# Patient Record
Sex: FEMALE | Race: WHITE | NOT HISPANIC OR LATINO | Employment: UNEMPLOYED | URBAN - NONMETROPOLITAN AREA
[De-identification: names, ages, dates, MRNs, and addresses within clinical notes are randomized per-mention and may not be internally consistent; named-entity substitution may affect disease eponyms.]

---
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
## 2024-01-22 ENCOUNTER — OFFICE VISIT (OUTPATIENT)
Dept: PSYCHIATRY | Facility: CLINIC | Age: 19
End: 2024-01-22
Payer: COMMERCIAL

## 2024-01-22 DIAGNOSIS — F90.9 ATTENTION DEFICIT HYPERACTIVITY DISORDER (ADHD), UNSPECIFIED ADHD TYPE: ICD-10-CM

## 2024-01-22 DIAGNOSIS — Z91.52 PERSONAL HISTORY OF NONSUICIDAL SELF-HARM: ICD-10-CM

## 2024-01-22 DIAGNOSIS — R45.81 LOW SELF ESTEEM: ICD-10-CM

## 2024-01-22 DIAGNOSIS — F43.10 POSTTRAUMATIC STRESS DISORDER: Primary | ICD-10-CM

## 2024-01-22 DIAGNOSIS — F39 UNSPECIFIED MOOD (AFFECTIVE) DISORDER: ICD-10-CM

## 2024-01-22 DIAGNOSIS — Z60.4 SOCIAL ISOLATION: ICD-10-CM

## 2024-01-22 DIAGNOSIS — F50.9 ATYPICAL ANOREXIA NERVOSA: ICD-10-CM

## 2024-01-22 SDOH — SOCIAL STABILITY - SOCIAL INSECURITY: SOCIAL EXCLUSION AND REJECTION: Z60.4

## 2024-01-24 ENCOUNTER — OFFICE VISIT (OUTPATIENT)
Dept: FAMILY MEDICINE CLINIC | Facility: CLINIC | Age: 19
End: 2024-01-24
Payer: COMMERCIAL

## 2024-01-24 VITALS
BODY MASS INDEX: 27.6 KG/M2 | WEIGHT: 150 LBS | DIASTOLIC BLOOD PRESSURE: 85 MMHG | SYSTOLIC BLOOD PRESSURE: 120 MMHG | HEIGHT: 62 IN | OXYGEN SATURATION: 99 % | TEMPERATURE: 97 F

## 2024-01-24 DIAGNOSIS — F33.3 SEVERE EPISODE OF RECURRENT MAJOR DEPRESSIVE DISORDER, WITH PSYCHOTIC FEATURES: Primary | ICD-10-CM

## 2024-01-24 DIAGNOSIS — R45.88 NONSUICIDAL SELF-INJURY: ICD-10-CM

## 2024-01-24 DIAGNOSIS — E55.9 VITAMIN D DEFICIENCY: ICD-10-CM

## 2024-01-24 DIAGNOSIS — R79.89 ELEVATED LFTS: ICD-10-CM

## 2024-01-24 DIAGNOSIS — Q24.9 HEART DEFECT, CONGENITAL: ICD-10-CM

## 2024-01-24 DIAGNOSIS — T07.XXXA MULTIPLE ABRASIONS: ICD-10-CM

## 2024-01-24 DIAGNOSIS — F39 UNSPECIFIED MOOD (AFFECTIVE) DISORDER: ICD-10-CM

## 2024-01-24 DIAGNOSIS — F41.9 ANXIETY DISORDER, UNSPECIFIED TYPE: ICD-10-CM

## 2024-01-24 PROCEDURE — 86803 HEPATITIS C AB TEST: CPT | Performed by: PHYSICIAN ASSISTANT

## 2024-01-24 PROCEDURE — 82977 ASSAY OF GGT: CPT | Performed by: PHYSICIAN ASSISTANT

## 2024-01-24 PROCEDURE — 80050 GENERAL HEALTH PANEL: CPT | Performed by: PHYSICIAN ASSISTANT

## 2024-01-24 PROCEDURE — 84466 ASSAY OF TRANSFERRIN: CPT | Performed by: PHYSICIAN ASSISTANT

## 2024-01-24 PROCEDURE — 82306 VITAMIN D 25 HYDROXY: CPT | Performed by: PHYSICIAN ASSISTANT

## 2024-01-24 PROCEDURE — 86704 HEP B CORE ANTIBODY TOTAL: CPT | Performed by: PHYSICIAN ASSISTANT

## 2024-01-24 PROCEDURE — 86706 HEP B SURFACE ANTIBODY: CPT | Performed by: PHYSICIAN ASSISTANT

## 2024-01-24 PROCEDURE — 80061 LIPID PANEL: CPT | Performed by: PHYSICIAN ASSISTANT

## 2024-01-24 PROCEDURE — 83540 ASSAY OF IRON: CPT | Performed by: PHYSICIAN ASSISTANT

## 2024-01-24 PROCEDURE — 82728 ASSAY OF FERRITIN: CPT | Performed by: PHYSICIAN ASSISTANT

## 2024-01-24 PROCEDURE — 83735 ASSAY OF MAGNESIUM: CPT | Performed by: PHYSICIAN ASSISTANT

## 2024-01-24 RX ORDER — HYDROXYZINE 50 MG/1
50 TABLET, FILM COATED ORAL 2 TIMES DAILY
COMMUNITY

## 2024-01-24 RX ORDER — PRAZOSIN HYDROCHLORIDE 1 MG/1
1 CAPSULE ORAL NIGHTLY
COMMUNITY
End: 2024-01-24 | Stop reason: SDUPTHER

## 2024-01-24 RX ORDER — BUSPIRONE HYDROCHLORIDE 7.5 MG/1
7.5 TABLET ORAL 2 TIMES DAILY
Qty: 60 TABLET | Refills: 0 | Status: SHIPPED | OUTPATIENT
Start: 2024-01-24

## 2024-01-24 RX ORDER — PRAZOSIN HYDROCHLORIDE 2 MG/1
2 CAPSULE ORAL NIGHTLY
Qty: 30 CAPSULE | Refills: 0 | Status: SHIPPED | OUTPATIENT
Start: 2024-01-24

## 2024-01-24 RX ORDER — BUSPIRONE HYDROCHLORIDE 5 MG/1
5 TABLET ORAL 2 TIMES DAILY
COMMUNITY
End: 2024-01-24 | Stop reason: SDUPTHER

## 2024-01-24 RX ORDER — SERTRALINE HYDROCHLORIDE 100 MG/1
200 TABLET, FILM COATED ORAL DAILY
Start: 2024-01-24

## 2024-01-24 NOTE — PROGRESS NOTES
"    Subjective        Chief Complaint  Establish Care    Subjective      Alyse Campbell is a 18 y.o. female who presents today to [facility] for Establish Care. She has a past medical history of ADHD, Anxiety, Asthma, Depression, PTSD, Self-injurious behavior, and Suicide attempt. She also has a history of a \"hole in her heart.\"    Depression:   Anxiety:   Self harm:   Hx of self-injurious behavior:   Eating disorder:   Reports a long history of anxiety, depression, and self injurious behavior.  She had previously been following with  psychiatry, however, last appointment with the psychiatric NP was in April 2023 and she did not keep her follow up in May 2023.  She has recently reestablished with the counselor there and was recommended to come here to establish primary care and obtain labs.  She has an upcoming appointment with the psychiatric NP, however, this is not until early March 2024.    She does report that she intermittently lives in [place] which is where her family lives. She prefers not to go home. She is here to go to [employer] and she wants to go into neuroscience. She has a lot of anxiety about school. She reports she is young for her classes and feels awkward at times. She has a roommate whom she gets along with. She reports a history of trauma growing up and this has affected her for many years. She does not go into detail. She has trouble sleeping at night. She is currently managed on prazosin 1mg nightly, sertraline 200mg daily, buspar 5mg BID, and hydroxyzine twice daily, she is unsure of that dose. She also takes methylphenidate for ADHD. She had seen her PCP in [place] about 1 week ago and reportedly was recommended to discontinue all of her medications with plans to start Effexor, however, she had not completed this transition yet.  She has not taken her medications regularly in the past, however, does report she has recently began taking her " "medications regularly again.     She has a history of nonsuicidal self injurious behavior and previous suicide attempt.  She reports cutting herself for several years and she has recently started this again.  She reports the cuts are mostly on her arms and superficial. She feels a need to punish herself and cause pain. She also restricts her eating at times. She has had some recent nonspecific suicidal thoughts, but denies any specific plan or intent. Reports having people to go to if she has these thoughts, but does not utilize them all of time. Her last attempt at suicide was reportedly in the middle of summer 2023 when she \"took some pills and went over to the [facility] tracks.\"  She reports being hospitalized at an inpatient psychiatric facility at that time and reports it did not help her symptoms. She reports her symptoms are not as bad as they were last summer. Adamantly denies current thoughts, intent, or plan for suicide and states she would never do so.  Has goals of completing school and becoming a neuroscientist.     D[place]den Suicide Severity Rating (C-SSRS): 3= Moderate risk.     Heart Defect:  She also reports being told she had a hole in her heart when she was a child.  She was evaluated for surgical correction, however, reported it is a high risk surgery.  She was last seen by cardiology as a child in [place].  No recent evaluation or echocardiogram.  She has chronic shortness of breath and chest discomfort with exertion, no recent change.  She also has chronic palpitations and wore a Holter monitor in [place] over the past summer without reported concerns.  She has a history of a syncopal episode over the summer, however, this was with significant heat exposure.  No recurrent syncope since then.    Hx of elevated LFTs:   LFTs previously elevated in the ER in 04/2023. Has not had them rechecked. Previously felt to be 2/2 her medications. No reported alcohol use or regular tylenol. No known " "liver conditions.       Current Outpatient Medications:     busPIRone (BUSPAR) 7.5 MG tablet, Take 1 tablet by mouth 2 (Two) Times a Day., Disp: 60 tablet, Rfl: 0    methylphenidate 27 MG CR tablet, TAKE ONE TABLET BY MOUTH EVERY MORNING. MAX DAILY AMOUNT 27MG, Disp: , Rfl:     prazosin (MINIPRESS) 2 MG capsule, Take 1 capsule by mouth Every Night., Disp: 30 capsule, Rfl: 0    sertraline (ZOLOFT) 100 MG tablet, Take 2 tablets by mouth Daily. Indications: Major Depressive Disorder, Disp: , Rfl:     hydrOXYzine (ATARAX) 50 MG tablet, Take 1 tablet by mouth 2 (Two) Times a Day., Disp: , Rfl:     mupirocin (BACTROBAN) 2 % ointment, Apply 1 application  topically to the appropriate area as directed 3 (Three) Times a Day for 10 days., Disp: 30 g, Rfl: 0      No Known Allergies    Objective     Objective   Vital Signs:  /85   Temp 97 °F (36.1 °C) (Temporal)   Ht 157.5 cm (62.01\")   Wt 68 kg (150 lb)   SpO2 99%   BMI 27.43 kg/m²   Estimated body mass index is 27.43 kg/m² as calculated from the following:    Height as of this encounter: 157.5 cm (62.01\").    Weight as of this encounter: 68 kg (150 lb).    Pediatric BMI = 88 %ile (Z= 1.20) based on CDC (Girls, 2-20 Years) BMI-for-age based on BMI available as of 1/24/2024.. BMI is >= 25 and <30. (Overweight) The following options were offered after discussion;: weight loss educational material (shared in after visit summary)    Past Medical History:   Diagnosis Date    ADHD (attention deficit hyperactivity disorder)     Anxiety     Asthma     Depression     Self-injurious behavior     Started cutting at age 10.    Suicide attempt     10/2022-\"I swallowed pills.\"     Past Surgical History:   Procedure Laterality Date    NO PAST SURGERIES       Social History     Socioeconomic History    Marital status: Single    Number of children: 0    Years of education: 12 th    Highest education level: High school graduate   Tobacco Use    Smoking status: Never     Passive " exposure: Never    Smokeless tobacco: Never   Vaping Use    Vaping Use: Never used   Substance and Sexual Activity    Alcohol use: Never    Drug use: Never    Sexual activity: Not Currently     Partners: Male      Physical Exam  Vitals and nursing note reviewed.   Constitutional:       General: She is not in acute distress.     Appearance: She is well-developed. She is not diaphoretic.      Comments: She is quiet. Responds to questions appropriately, but generally pauses for a bit prior to responding.    HENT:      Head: Normocephalic and atraumatic.   Eyes:      General: No scleral icterus.        Right eye: No discharge.         Left eye: No discharge.      Conjunctiva/sclera: Conjunctivae normal.   Cardiovascular:      Rate and Rhythm: Normal rate and regular rhythm.      Heart sounds: Normal heart sounds. No murmur heard.     No friction rub. No gallop.   Pulmonary:      Effort: Pulmonary effort is normal. No respiratory distress.      Breath sounds: Normal breath sounds. No wheezing or rales.   Chest:      Chest wall: No tenderness.   Musculoskeletal:         General: Normal range of motion.      Cervical back: Normal range of motion and neck supple.      Right lower leg: No edema.      Left lower leg: No edema.   Skin:     General: Skin is warm and dry.      Capillary Refill: Capillary refill takes less than 2 seconds.      Coloration: Skin is not pale.      Findings: No erythema or rash.      Comments: Patient was agreeable to allowing me to evaluate her forearms where she has been cutting. Multiple superficial horizontal cuts on the proximal forearm bilaterally. Mild surrounding erythema on the left > right. Also mild warmth, left > right. Scabbing noted. Scarring from previous similar cuts also noted. No deep cuts appreciated.    Neurological:      Mental Status: She is alert and oriented to person, place, and time.   Psychiatric:      Comments: She appears anxious. Slow to respond to questioning at times,  "but does respond appropriately.         Result Review :  The following data was reviewed by: SYBIL Latham on 01/24/2024:  No results found for: \"CBCDIFFPANEL\", \"CMP\", \"HGBA1C\", \"TSH\", \"HDL\", \"LDL\", \"TRIG\", \"CHLPL\", \"XSOO376\"        Assessment / Plan         Assessment   Diagnoses and all orders for this visit:    1. Severe episode of recurrent major depressive disorder, with psychotic features (Primary)  2. Nonsuicidal self-injury  3. Unspecified mood (affective) disorder  4. Anxiety disorder, unspecified type  Currently adamantly denies SI/HI. Discussed that with any development of SI/HI, to call 911 or go directly to the ER. I have also provided with resources in the AVS and numbers for suicide hotlines.   Continue counseling.   Currently not scheduled to get back in with Sherrie NANCY Rojas until 03/2024, will see about getting her back in sooner if possible. Will also plan to discuss medications with Sherrie when she is back in the office.   Given increased anxiety and trouble sleeping seem to be worsening her overall mood and depression, discussed option of increasing buspar to 7.5mg BID and prazosin to 2mg nightly. Discussed risks, benefits, alternatives. She expressed understanding and agreed.   Continue sertraline, currently at max dose of 200mg daily.   Follow up in 2 weeks or sooner if needed.     5. Elevated LFTs  She is agreeable to updated labs, obtained today as well as additional testing including viral hepatitis, iron profile, and GGT.     6. Heart defect  Obtain updated transthoracic echocardiogram with bubble study.   Consider referral to cardiology pending results.   Reports having a holter over the summer in [place] without reported abnormality. Will consider repeating if symptoms are increasing or becoming more frequent.     7. Multiple abrasions bilateral upper extremities  Discussed risks of self harm. Discussed signs/symptoms of infection and things to monitor for. "   Antibacterial ointment was applied to both proximal forearms and lightly dressed with gauze and coban. Patient denied them being tight or uncomfortable. Bactroban sent to pharmacy given mild redness and warmth noted. Apply TID x 10 days. Then can put thin layer of Vaseline to keep the areas moist until healed.   Return to clinic if no improvement noted or if symptoms are worsening.  As noted above, adamantly denies SI/HI at this time. No suicidal thoughts, plan, or intent. Contact 911 or go to the ER should these develop.    Other orders  -     busPIRone (BUSPAR) 7.5 MG tablet; Take 1 tablet by mouth 2 (Two) Times a Day.  Dispense: 60 tablet; Refill: 0  -     mupirocin (BACTROBAN) 2 % ointment; Apply 1 application  topically to the appropriate area as directed 3 (Three) Times a Day for 10 days.  Dispense: 30 g; Refill: 0  -     prazosin (MINIPRESS) 2 MG capsule; Take 1 capsule by mouth Every Night.  Dispense: 30 capsule; Refill: 0     New Medications Ordered This Visit   Medications    sertraline (ZOLOFT) 100 MG tablet     Sig: Take 2 tablets by mouth Daily. Indications: Major Depressive Disorder    busPIRone (BUSPAR) 7.5 MG tablet     Sig: Take 1 tablet by mouth 2 (Two) Times a Day.     Dispense:  60 tablet     Refill:  0    mupirocin (BACTROBAN) 2 % ointment     Sig: Apply 1 application  topically to the appropriate area as directed 3 (Three) Times a Day for 10 days.     Dispense:  30 g     Refill:  0    prazosin (MINIPRESS) 2 MG capsule     Sig: Take 1 capsule by mouth Every Night.     Dispense:  30 capsule     Refill:  0     Follow Up   Return in about 2 weeks (around 2/7/2024), or if symptoms worsen or fail to improve, for Recheck.    I spent 100 minutes caring for Alyse Campbell on this date of service. This time includes time spent by me in the following activities: preparing for the visit, reviewing tests, obtaining and/or reviewing a separately obtained history, performing a medically appropriate  examination and/or evaluation , counseling and educating the patient/family/caregiver, ordering medications, tests, or procedures and documenting information in the medical record.       Patient was given instructions and counseling regarding her condition or for health maintenance advice. Please see specific information pulled into the AVS if appropriate.       This document has been electronically signed by SYBIL Latham   January 24, 2024 12:15 EST    Dictated Utilizing Dragon Dictation: Part of this note may be an electronic transcription/translation of spoken language to printed text using the Dragon Dictation System.

## 2024-01-25 LAB
25(OH)D3 SERPL-MCNC: 20.9 NG/ML (ref 30–100)
ALBUMIN SERPL-MCNC: 5 G/DL (ref 3.5–5.2)
ALBUMIN/GLOB SERPL: 2.4 G/DL
ALP SERPL-CCNC: 69 U/L (ref 39–117)
ALT SERPL W P-5'-P-CCNC: 10 U/L (ref 1–33)
ANION GAP SERPL CALCULATED.3IONS-SCNC: 12 MMOL/L (ref 5–15)
AST SERPL-CCNC: 15 U/L (ref 1–32)
BASOPHILS # BLD AUTO: 0.03 10*3/MM3 (ref 0–0.2)
BASOPHILS NFR BLD AUTO: 0.6 % (ref 0–1.5)
BILIRUB SERPL-MCNC: 0.2 MG/DL (ref 0–1.2)
BUN SERPL-MCNC: 10 MG/DL (ref 6–20)
BUN/CREAT SERPL: 13.3 (ref 7–25)
CALCIUM SPEC-SCNC: 9.6 MG/DL (ref 8.6–10.5)
CHLORIDE SERPL-SCNC: 106 MMOL/L (ref 98–107)
CHOLEST SERPL-MCNC: 154 MG/DL (ref 0–200)
CO2 SERPL-SCNC: 19 MMOL/L (ref 22–29)
CREAT SERPL-MCNC: 0.75 MG/DL (ref 0.57–1)
DEPRECATED RDW RBC AUTO: 44.7 FL (ref 37–54)
EGFRCR SERPLBLD CKD-EPI 2021: 117.8 ML/MIN/1.73
EOSINOPHIL # BLD AUTO: 0.04 10*3/MM3 (ref 0–0.4)
EOSINOPHIL NFR BLD AUTO: 0.8 % (ref 0.3–6.2)
ERYTHROCYTE [DISTWIDTH] IN BLOOD BY AUTOMATED COUNT: 15.2 % (ref 12.3–15.4)
FERRITIN SERPL-MCNC: 7.28 NG/ML (ref 13–150)
GGT SERPL-CCNC: 12 U/L (ref 5–36)
GLOBULIN UR ELPH-MCNC: 2.1 GM/DL
GLUCOSE SERPL-MCNC: 93 MG/DL (ref 65–99)
HBV SURFACE AB SER RIA-ACNC: NORMAL
HCT VFR BLD AUTO: 35.6 % (ref 34–46.6)
HCV AB SER DONR QL: NORMAL
HDLC SERPL-MCNC: 62 MG/DL (ref 40–60)
HGB BLD-MCNC: 11.6 G/DL (ref 12–15.9)
IMM GRANULOCYTES # BLD AUTO: 0.01 10*3/MM3 (ref 0–0.05)
IMM GRANULOCYTES NFR BLD AUTO: 0.2 % (ref 0–0.5)
IRON 24H UR-MRATE: 28 MCG/DL (ref 37–145)
IRON SATN MFR SERPL: 6 % (ref 20–50)
LDLC SERPL CALC-MCNC: 81 MG/DL (ref 0–100)
LDLC/HDLC SERPL: 1.32 {RATIO}
LYMPHOCYTES # BLD AUTO: 1.18 10*3/MM3 (ref 0.7–3.1)
LYMPHOCYTES NFR BLD AUTO: 23.7 % (ref 19.6–45.3)
MAGNESIUM SERPL-MCNC: 2.4 MG/DL (ref 1.7–2.2)
MCH RBC QN AUTO: 26.7 PG (ref 26.6–33)
MCHC RBC AUTO-ENTMCNC: 32.6 G/DL (ref 31.5–35.7)
MCV RBC AUTO: 82 FL (ref 79–97)
MONOCYTES # BLD AUTO: 0.24 10*3/MM3 (ref 0.1–0.9)
MONOCYTES NFR BLD AUTO: 4.8 % (ref 5–12)
NEUTROPHILS NFR BLD AUTO: 3.48 10*3/MM3 (ref 1.7–7)
NEUTROPHILS NFR BLD AUTO: 69.9 % (ref 42.7–76)
NRBC BLD AUTO-RTO: 0 /100 WBC (ref 0–0.2)
PLATELET # BLD AUTO: 346 10*3/MM3 (ref 140–450)
PMV BLD AUTO: 11.1 FL (ref 6–12)
POTASSIUM SERPL-SCNC: 3.8 MMOL/L (ref 3.5–5.2)
PROT SERPL-MCNC: 7.1 G/DL (ref 6–8.5)
RBC # BLD AUTO: 4.34 10*6/MM3 (ref 3.77–5.28)
SODIUM SERPL-SCNC: 137 MMOL/L (ref 136–145)
TIBC SERPL-MCNC: 450 MCG/DL (ref 298–536)
TRANSFERRIN SERPL-MCNC: 302 MG/DL (ref 200–360)
TRIGL SERPL-MCNC: 50 MG/DL (ref 0–150)
TSH SERPL DL<=0.05 MIU/L-ACNC: 2.19 UIU/ML (ref 0.27–4.2)
VLDLC SERPL-MCNC: 11 MG/DL (ref 5–40)
WBC NRBC COR # BLD AUTO: 4.98 10*3/MM3 (ref 3.4–10.8)

## 2024-01-25 RX ORDER — MELATONIN
1000 DAILY
Qty: 30 TABLET | Refills: 3 | Status: SHIPPED | OUTPATIENT
Start: 2024-01-25

## 2024-01-25 RX ORDER — IRON POLYSACCHARIDE COMPLEX 150 MG
150 CAPSULE ORAL DAILY
Qty: 30 CAPSULE | Refills: 3 | Status: SHIPPED | OUTPATIENT
Start: 2024-01-25

## 2024-01-26 LAB — HBV CORE AB SERPL QL IA: NEGATIVE

## 2024-01-30 ENCOUNTER — PATIENT ROUNDING (BHMG ONLY) (OUTPATIENT)
Dept: FAMILY MEDICINE CLINIC | Facility: CLINIC | Age: 19
End: 2024-01-30
Payer: COMMERCIAL

## 2024-01-30 NOTE — PROGRESS NOTES
January 30, 2024    Hello, may I speak with Alyse Campbell?    My name is garrett barber        I am  with [facility]  [address] [place] [id number] [phone].    Before we get started may I verify your date of birth? 2005    I am calling to officially welcome you to our practice and ask about your recent visit. Is this a good time to talk? Yes  Alyse call the office back     Tell me about your visit with us. What things went well?  very well       We're always looking for ways to make our patients' experiences even better. Do you have recommendations on ways we may improve?  no    Overall were you satisfied with your first visit to our practice? yes       I appreciate you taking the time to speak with me today. Is there anything else I can do for you? no      Thank you, and have a great day.

## 2024-01-30 NOTE — PROGRESS NOTES
January 30, 2024    Hello, may I speak with Eli Campbell?    My name is Reina Grey       I am  with [facility]  [address] [place] [id number] [phone].    Before we get started may I verify your date of birth? 2005    I am calling to officially welcome you to our practice and ask about your recent visit. Is this a good time to talk? Left voice mail for eli to call the office back     Tell me about your visit with us. What things went well?         We're always looking for ways to make our patients' experiences even better. Do you have recommendations on ways we may improve?      Overall were you satisfied with your first visit to our practice?        I appreciate you taking the time to speak with me today. Is there anything else I can do for you?       Thank you, and have a great day.

## 2024-02-07 ENCOUNTER — OFFICE VISIT (OUTPATIENT)
Dept: FAMILY MEDICINE CLINIC | Facility: CLINIC | Age: 19
End: 2024-02-07
Payer: COMMERCIAL

## 2024-02-07 VITALS
OXYGEN SATURATION: 99 % | DIASTOLIC BLOOD PRESSURE: 90 MMHG | WEIGHT: 144.4 LBS | SYSTOLIC BLOOD PRESSURE: 135 MMHG | BODY MASS INDEX: 26.57 KG/M2 | HEART RATE: 113 BPM | TEMPERATURE: 97.2 F | HEIGHT: 62 IN

## 2024-02-07 DIAGNOSIS — Q24.9 HEART DEFECT, CONGENITAL: ICD-10-CM

## 2024-02-07 DIAGNOSIS — F39 UNSPECIFIED MOOD (AFFECTIVE) DISORDER: ICD-10-CM

## 2024-02-07 DIAGNOSIS — F33.3 SEVERE EPISODE OF RECURRENT MAJOR DEPRESSIVE DISORDER, WITH PSYCHOTIC FEATURES: Primary | ICD-10-CM

## 2024-02-07 DIAGNOSIS — R45.88 NONSUICIDAL SELF-INJURY: ICD-10-CM

## 2024-02-07 DIAGNOSIS — F41.9 ANXIETY DISORDER, UNSPECIFIED TYPE: ICD-10-CM

## 2024-02-07 DIAGNOSIS — R51.9 BILATERAL HEADACHES: ICD-10-CM

## 2024-02-07 DIAGNOSIS — T07.XXXA MULTIPLE ABRASIONS: ICD-10-CM

## 2024-02-08 ENCOUNTER — OFFICE VISIT (OUTPATIENT)
Dept: PSYCHIATRY | Facility: CLINIC | Age: 19
End: 2024-02-08
Payer: COMMERCIAL

## 2024-02-08 ENCOUNTER — REFERRAL TRIAGE (OUTPATIENT)
Age: 19
End: 2024-02-08
Payer: COMMERCIAL

## 2024-02-08 VITALS — WEIGHT: 143 LBS | BODY MASS INDEX: 26.31 KG/M2 | OXYGEN SATURATION: 98 % | HEART RATE: 111 BPM | HEIGHT: 62 IN

## 2024-02-08 DIAGNOSIS — F90.9 ATTENTION DEFICIT HYPERACTIVITY DISORDER (ADHD), UNSPECIFIED ADHD TYPE: ICD-10-CM

## 2024-02-08 DIAGNOSIS — F41.9 ANXIETY DISORDER, UNSPECIFIED TYPE: ICD-10-CM

## 2024-02-08 DIAGNOSIS — F50.9 ATYPICAL ANOREXIA NERVOSA: ICD-10-CM

## 2024-02-08 DIAGNOSIS — F43.10 POSTTRAUMATIC STRESS DISORDER: Primary | ICD-10-CM

## 2024-02-08 DIAGNOSIS — F33.1 MODERATE EPISODE OF RECURRENT MAJOR DEPRESSIVE DISORDER: ICD-10-CM

## 2024-02-08 DIAGNOSIS — Z91.52 PERSONAL HISTORY OF NONSUICIDAL SELF-HARM: ICD-10-CM

## 2024-02-08 PROCEDURE — 99214 OFFICE O/P EST MOD 30 MIN: CPT | Performed by: NURSE PRACTITIONER

## 2024-02-08 RX ORDER — RIMEGEPANT SULFATE 75 MG/75MG
75 TABLET, ORALLY DISINTEGRATING ORAL DAILY PRN
Qty: 8 TABLET | Refills: 2 | Status: SHIPPED | OUTPATIENT
Start: 2024-02-08 | End: 2024-02-14

## 2024-02-08 NOTE — PROGRESS NOTES
Subjective   Alyse Campbell is a 18 y.o. female is here today for medication management follow-up.    Chief Complaint:  anxiety depression     History of Present Illness:    Patient presents today for a follow up for medication management for anxiety and depression. Patient states went to hospital in April due to sucidal plan at [place]. Patient states was in hospital for about a day. Patient states never followed up with neurlogist but did see PCP. Patient states school is overwhelming. Patient states not really eating and lost 7 lbs in about two weeks. Patient reports a decreased appetite but also restricting food. Patient states she don't like cafeteria at school and she is not sure last day she ate. Patient states not sleeping good and only gotten about 5 hours total for this week. Patient states can't fall asleep due to mind racing. Patient states sometimes scared going to have nightmares because sometimes still have them. Patient states started taking the buspar and prazosin this summer from PCP. Depression at a 7 on a 0/10 scale with 10 the worst. Patient states with anxiety can feel heart racing all the time but don't feel too anxious. Patient states had some panic attacks with last one a couple weeks ago. Patient reports the anxiety is better than depression. Patient states two and half weeks ago PCP stopped current meds and swtiched her to a different medication. Patient states had thoughts then and went for a walk to calm down. Patient states got started back on current medicine after about five days. Patient states the second week of school started cutting again. Patient states the last time cutting was a few days ago on arm using a razorblade. Patient reports dealing with headache constantly that feels like pounding pressure. Patient states having it every day and feels sorta like when had concussion. Patient states had a headache since concussion. Patient reports her legs constantly having a nervous  "twitch. Denies any SI or HI. Denies any auditory or visual hallucinations.   The following portions of the patient's history were reviewed and updated as appropriate: allergies, current medications, past family history, past medical history, past social history, past surgical history, and problem list.    Review of Systems   Constitutional:  Positive for appetite change.   Respiratory: Negative.     Cardiovascular: Negative.    Gastrointestinal: Negative.    Neurological: Negative.    Psychiatric/Behavioral:  Positive for dysphoric mood, self-injury and sleep disturbance. The patient is nervous/anxious.        Objective   Physical Exam  Vitals reviewed.   Constitutional:       Appearance: Normal appearance. She is well-developed and well-groomed.   Neurological:      Mental Status: She is alert.   Psychiatric:         Attention and Perception: Attention and perception normal.         Mood and Affect: Affect normal. Mood is anxious.         Speech: Speech is delayed.         Behavior: Behavior normal. Behavior is cooperative.         Thought Content: Thought content normal.         Cognition and Memory: Cognition and memory normal.         Judgment: Judgment is impulsive.       Pulse 111, height 157.5 cm (62.01\"), weight 64.9 kg (143 lb), SpO2 98%.Body mass index is 26.15 kg/m².    No Known Allergies    Medication List:   Current Outpatient Medications   Medication Sig Dispense Refill    busPIRone (BUSPAR) 7.5 MG tablet Take 1 tablet by mouth 2 (Two) Times a Day. 60 tablet 0    cholecalciferol (Vitamin D) 25 MCG (1000 UT) tablet Take 1 tablet by mouth Daily. 30 tablet 3    hydrOXYzine (ATARAX) 50 MG tablet Take 1 tablet by mouth 2 (Two) Times a Day.      iron polysaccharides (NIFEREX) 150 MG capsule Take 1 capsule by mouth Daily. 30 capsule 3    methylphenidate 27 MG CR tablet TAKE ONE TABLET BY MOUTH EVERY MORNING. MAX DAILY AMOUNT 27MG      prazosin (MINIPRESS) 2 MG capsule Take 1 capsule by mouth Every Night. 30 " capsule 0    sertraline (ZOLOFT) 100 MG tablet Take 2 tablets by mouth Daily. Indications: Major Depressive Disorder      propranolol (INDERAL) 10 MG tablet Take 1 tablet by mouth 2 (Two) Times a Day. 60 tablet 1     No current facility-administered medications for this visit.       Mental Status Exam:   Hygiene:   good  Cooperation:  Guarded  Eye Contact:  Fair  Psychomotor Behavior:  Appropriate  Affect:  Appropriate  Hopelessness: Denies  Speech:  Minimal  Thought Process:  Goal directed and Linear  Thought Content:  Normal  Suicidal:  None  Homicidal:  None  Hallucinations:  None  Delusion:  None  Memory:  Intact  Orientation:  Person, Place, Time, and Situation  Reliability:  poor  Insight:  Poor  Judgement:  Poor  Impulse Control:  Poor  Physical/Medical Issues:  No               Assessment & Plan   Diagnoses and all orders for this visit:    1. Posttraumatic stress disorder (Primary)  -     Ambulatory Referral to Social Care Services (Amb Case Mgmt)    2. Personal history of nonsuicidal self-harm  -     Ambulatory Referral to Social Care Services (Amb Case Mgmt)    3. Attention deficit hyperactivity disorder (ADHD), unspecified ADHD type  -     Ambulatory Referral to Social Care Services (Amb Case Mgmt)    4. Atypical anorexia nervosa  -     Ambulatory Referral to Social Care Services (Amb Case Mgmt)    5. Moderate episode of recurrent major depressive disorder  -     Ambulatory Referral to Social Care Services (Amb Case Mgmt)    6. Anxiety disorder, unspecified type  -     Ambulatory Referral to Social Care Services (Amb Case Mgmt)            Discussed medication options with patient. Discussed with patient will contact PCP Ilana regarding Neurology consult. Discussed with patient obtaining consent to consult [place]CP [place] due to PCP prescribing psychiatric medications. Patient states she would think about it. Discussed with patient scheduling an appointment with Maksim Powers LCSW for therapy.  Referral for social care services for case management for financial and transportation. Reviewed the risks, benefits, and side effects of the medications; patient acknowledged. Patient reports she does still have plenty of medication left. Discussed with patient due to risks associated with medication use will need to talk to PCP before sending in any medication. Patient acknowledged and agreed. Patient states she does have hotline number for suicide prevention if she does begin having any thoughts to harm self or others. Patient is agreeable to call the office with any questions, concerns, or worsening of symptoms. Patient is aware to call 911 or go to the nearest ER should begin having any thoughts to harm self or others.          Follow up in one week        Errors in dictation may reflect use of voice recognition software and not all errors in transcription may have been detected prior to signing.              This document has been electronically signed by NANCY Ureña   February 26, 2024 12:53 EST

## 2024-02-13 DIAGNOSIS — G44.52 NEW DAILY PERSISTENT HEADACHE: Primary | ICD-10-CM

## 2024-02-13 RX ORDER — PROPRANOLOL HYDROCHLORIDE 10 MG/1
10 TABLET ORAL 2 TIMES DAILY
Qty: 60 TABLET | Refills: 1 | Status: SHIPPED | OUTPATIENT
Start: 2024-02-13

## 2024-02-14 ENCOUNTER — PATIENT OUTREACH (OUTPATIENT)
Age: 19
End: 2024-02-14
Payer: COMMERCIAL

## 2024-02-19 ENCOUNTER — PATIENT OUTREACH (OUTPATIENT)
Age: 19
End: 2024-02-19
Payer: COMMERCIAL

## 2024-02-19 NOTE — OUTREACH NOTE
SW attempted to contact pt a second time, and scheduled a third call for one week out. SW will attempt again in a week.     Steven CASTILLO -   Ambulatory Case Management    2/19/2024, 10:36 EST

## 2024-02-26 ENCOUNTER — PATIENT OUTREACH (OUTPATIENT)
Age: 19
End: 2024-02-26
Payer: COMMERCIAL

## 2024-02-26 NOTE — OUTREACH NOTE
SW has attempted to contact pt with UTRx2. SW will D/c pt due to UTR in one week due to UTRx3, unless she responds back.    Steven CASTILLO -   Ambulatory Case Management    2/26/2024, 08:41 EST

## 2024-02-27 ENCOUNTER — PATIENT OUTREACH (OUTPATIENT)
Age: 19
End: 2024-02-27
Payer: COMMERCIAL

## 2024-02-27 NOTE — OUTREACH NOTE
Social Work Assessment  Questions/Answers      Flowsheet Row Most Recent Value   Referral Source physician   Reason for Consult community resources, financial concerns   Preferred Language English   People in Home other (see comments)   Current Living Arrangements other (see comments)   Potentially Unsafe Housing Conditions none   In the past 12 months has the electric, gas, oil, or water company threatened to shut off services in your home? No   Primary Care Provided by self   Provides Primary Care For no one   Family Caregiver if Needed none   Quality of Family Relationships stressful   Source of Income none, other (see comments)   Financial/Environmental Concerns unable to afford food, unable to afford medication(s), insurance inadequate   Application for Public Assistance obtained public assistance pending number   Q1: How often do you have a drink containing alcohol? Never   Q2: How many drinks containing alcohol do you have on a typical day when you are drinking? None   Q3: How often do you have six or more drinks on one occasion? Never   Audit-C Score 0        SDOH updated and reviewed with the patient during this program:  --     Alcohol Use: Not At Risk (2/27/2024)    AUDIT-C     Frequency of Alcohol Consumption: Never     Average Number of Drinks: Patient does not drink     Frequency of Binge Drinking: Never      --     Depression: At risk (2/7/2024)    PHQ-2     PHQ-2 Score: 19      --      Disabilities      --      Employment      Financial Resource Strain: High Risk (2/27/2024)    Overall Financial Resource Strain (CARDIA)     Difficulty of Paying Living Expenses: Very hard      --     Food Insecurity: Food Insecurity Present (2/27/2024)    Hunger Vital Sign     Worried About Running Out of Food in the Last Year: Sometimes true     Ran Out of Food in the Last Year: Sometimes true      --     Health Literacy: Unknown (2/27/2024)    Education     Preferred Language: English      --     Housing Stability:  Not At Risk (2/27/2024)    Housing Stability     Current Living Arrangements: other (see comments)     Potentially Unsafe Housing Conditions: none      --     Interpersonal Safety: Not At Risk (2/27/2024)    Humiliation, Afraid, Rape, and Kick questionnaire     Fear of Current or Ex-Partner: No     Emotionally Abused: No     Physically Abused: No     Sexually Abused: No        --     Social Connections: Unknown (2/27/2024)    Social Connection and Isolation Panel [NHANES]     Marital Status: Never       --     Stress: Stress Concern Present (2/27/2024)    [employer] - Occupational Stress Questionnaire     Feeling of Stress : Rather much      --     Tobacco Use: Low Risk  (2/26/2024)    Patient History     Smoking Tobacco Use: Never     Smokeless Tobacco Use: Never     Passive Exposure: Never      --     Transportation Needs: Unmet Transportation Needs (2/27/2024)    PRAPARE - Transportation     Lack of Transportation (Medical): Yes     Lack of Transportation (Non-Medical): Yes      --     Utilities: Not At Risk (2/27/2024)    [facility] Utilities     Threatened with loss of utilities: No      Continuing Care   [facility] SUPPLEMENTAL NUTRITIONAL ASSISTANCE PROGRAM    [address] [address]    Phone: [phone]    Resource for: Food Insecurity   Patient Outreach    JACKY spoke with pt to F/u regarding her dietary needs. JACKY provided pt with her srudent handbook policy re to dietary accomodations and discussed pt getting accomodations or a refund for her meal plan to use those funds to purchase food she was able to eat. JACKY noted that [employer] delivers locally and also provided information to the state SNAP hotline via My Chart. JACKY will allow one week to respond before D/cCuco CASTILLO -   Ambulatory Case Management    2/27/2024, 08:48 EST

## 2024-03-01 ENCOUNTER — OFFICE VISIT (OUTPATIENT)
Dept: PSYCHIATRY | Facility: CLINIC | Age: 19
End: 2024-03-01
Payer: COMMERCIAL

## 2024-03-01 VITALS — WEIGHT: 148 LBS | OXYGEN SATURATION: 98 % | BODY MASS INDEX: 27.23 KG/M2 | HEART RATE: 90 BPM | HEIGHT: 62 IN

## 2024-03-01 DIAGNOSIS — F43.10 POSTTRAUMATIC STRESS DISORDER: Primary | ICD-10-CM

## 2024-03-01 DIAGNOSIS — Z91.52 PERSONAL HISTORY OF NONSUICIDAL SELF-HARM: ICD-10-CM

## 2024-03-01 DIAGNOSIS — F90.9 ATTENTION DEFICIT HYPERACTIVITY DISORDER (ADHD), UNSPECIFIED ADHD TYPE: ICD-10-CM

## 2024-03-01 DIAGNOSIS — F39 UNSPECIFIED MOOD (AFFECTIVE) DISORDER: ICD-10-CM

## 2024-03-01 DIAGNOSIS — F50.9 ATYPICAL ANOREXIA NERVOSA: ICD-10-CM

## 2024-03-01 DIAGNOSIS — F33.1 MODERATE EPISODE OF RECURRENT MAJOR DEPRESSIVE DISORDER: ICD-10-CM

## 2024-03-01 NOTE — PROGRESS NOTES
Subjective   Alyse Campbell is a 18 y.o. female is here today for medication management follow-up.    Chief Complaint:  anxiety depression ADHD    History of Present Illness:    Patient presents today for a follow up for medication management for anxiety, depression, and ADHD. Denies any medical changes since last visit. Patient states some days taking medicine some days not taking it. Patient states not wanting to take medicine due to too many of them. Patient states if miss one pill then miss all. Patient states on zoloft 200 mg, buspar 10 mg, Prazosin 2 mg. Patient states don't want to take all the medicines but feel like need them. Patient states family provider open every week day until 5 pm. Patient reports not feeling good due to headache and mental health. Sleeping better and getting about 5 hours a night. Patient reports some days straight through but a lot of nights waking up. Patient states having some nightmares. Patient states not ate today but yesterday ate once. Patient states not sure if ate anything day before. Depression at a 8 on a 0/10 scale with 10 the worst. Denies any thoughts to harm self or others. Patient reports she did cut self about three days ago due to all the voices in head screaming. Patient reports the voices in head screaming and they do that often. Patient states one of the voices is her but other voices are not. Patient states sounds like mother but not her voice. Denies any visual hallucinations. Patient reports sometimes see things out of corner of eyes. Patient reports most of the time voices tell random things such as just lay in road and don't do it because it would be embarrassing. Anxiety at a 4 on a 0/10 scale with 10 the worst. Patient states had one panic in the last couple weeks that lasted an hour. Patient states hyperventilating at times. Denies any anger or aggression. Patient reports having some mood swings. Patient states not been taking Concerta and last time  "took it can't remember. Patient states yesterday was really hard with focusing and attention. Patient states yesterday they were moving too slow and got frustrated due to wanting to go faster. Patient states she couldn't sit there anymore. Patient states one of the voices reminds her of herself before shutting everything down. Patient states she was buillied bad around 4th grade and thought was pregnant. Patient states has all As but struggling with them not all being 100s. Patient reports some times don't feel like trauma is real and sometimes \"don't feel I am real\". Patient reports she is afraid to bring memories back and afriad to journal.   The following portions of the patient's history were reviewed and updated as appropriate: allergies, current medications, past family history, past medical history, past social history, past surgical history, and problem list.    Review of Systems   Constitutional:  Positive for appetite change.   Respiratory: Negative.     Cardiovascular: Negative.    Gastrointestinal: Negative.    Neurological: Negative.    Psychiatric/Behavioral:  Positive for decreased concentration, dysphoric mood, hallucinations, self-injury and sleep disturbance. The patient is nervous/anxious.        Objective   Physical Exam  Vitals reviewed.   Constitutional:       Appearance: Normal appearance. She is well-developed and well-groomed.   Neurological:      Mental Status: She is alert.   Psychiatric:         Attention and Perception: Attention and perception normal.         Mood and Affect: Affect normal. Mood is anxious.         Speech: Speech normal.         Behavior: Behavior normal. Behavior is cooperative.         Thought Content: Thought content normal.         Cognition and Memory: Cognition and memory normal.         Judgment: Judgment normal.     Pulse 90, height 157.5 cm (62.01\"), weight 67.1 kg (148 lb), SpO2 98%.Body mass index is 27.06 kg/m².    No Known Allergies    Medication List: "   Current Outpatient Medications   Medication Sig Dispense Refill    busPIRone (BUSPAR) 7.5 MG tablet Take 1 tablet by mouth 2 (Two) Times a Day. 60 tablet 0    cholecalciferol (Vitamin D) 25 MCG (1000 UT) tablet Take 1 tablet by mouth Daily. 30 tablet 3    hydrOXYzine (ATARAX) 50 MG tablet Take 1 tablet by mouth 2 (Two) Times a Day.      iron polysaccharides (NIFEREX) 150 MG capsule Take 1 capsule by mouth Daily. 30 capsule 3    methylphenidate 27 MG CR tablet TAKE ONE TABLET BY MOUTH EVERY MORNING. MAX DAILY AMOUNT 27MG      prazosin (MINIPRESS) 2 MG capsule Take 1 capsule by mouth Every Night. 30 capsule 0    sertraline (ZOLOFT) 100 MG tablet Take 2 tablets by mouth Daily. Indications: Major Depressive Disorder       No current facility-administered medications for this visit.       Mental Status Exam:   Hygiene:   good  Cooperation:  Cooperative  Eye Contact:  Good  Psychomotor Behavior:  Appropriate  Affect:  Appropriate  Hopelessness: Denies  Speech:  Normal  Thought Process:  Goal directed and Linear  Thought Content:  Normal  Suicidal:  None  Homicidal:  None  Hallucinations:  None  Delusion:  None  Memory:  Intact  Orientation:  Person, Place, Time, and Situation  Reliability:  fair  Insight:  Fair  Judgement:  Fair  Impulse Control:  Fair  Physical/Medical Issues:  No              Assessment & Plan   Diagnoses and all orders for this visit:    1. Posttraumatic stress disorder (Primary)    2. Personal history of nonsuicidal self-harm    3. Attention deficit hyperactivity disorder (ADHD), unspecified ADHD type    4. Moderate episode of recurrent major depressive disorder    5. Unspecified mood (affective) disorder    6. Atypical anorexia nervosa            Discussed medication options with patient. Discussed with patient starting antipsychotic for mood and hallucinations. Discussed with patient contacting PCP regarding medication before adjusting. Patient acknowledged and agreed to consent for PCP office.  Reviewed the risks, benefits, and side effects of the medications; patient acknowledged. Discussed with patient healthy eating habits and sleep hygiene. Patient is agreeable to call the office with any questions, concerns, or worsening of symptoms. Patient is aware to call 911 or go to the nearest ER should begin having any thoughts to harm self or others.           Follow up in four weeks        Errors in dictation may reflect use of voice recognition software and not all errors in transcription may have been detected prior to signing.              This document has been electronically signed by NANCY Ureña   March 15, 2024 13:08 EDT

## 2024-03-07 ENCOUNTER — OFFICE VISIT (OUTPATIENT)
Dept: PSYCHIATRY | Facility: CLINIC | Age: 19
End: 2024-03-07
Payer: COMMERCIAL

## 2024-03-07 DIAGNOSIS — Z91.51 HISTORY OF SUICIDE ATTEMPT: ICD-10-CM

## 2024-03-07 DIAGNOSIS — F43.10 POSTTRAUMATIC STRESS DISORDER: Primary | ICD-10-CM

## 2024-03-07 DIAGNOSIS — R45.851 PASSIVE SUICIDAL IDEATIONS: ICD-10-CM

## 2024-03-07 DIAGNOSIS — F33.1 MODERATE EPISODE OF RECURRENT MAJOR DEPRESSIVE DISORDER: ICD-10-CM

## 2024-03-07 DIAGNOSIS — F50.9 ATYPICAL ANOREXIA NERVOSA: ICD-10-CM

## 2024-03-07 DIAGNOSIS — F90.9 ATTENTION DEFICIT HYPERACTIVITY DISORDER (ADHD), UNSPECIFIED ADHD TYPE: ICD-10-CM

## 2024-03-07 DIAGNOSIS — Z91.52 PERSONAL HISTORY OF NONSUICIDAL SELF-HARM: ICD-10-CM

## 2024-03-11 ENCOUNTER — OFFICE VISIT (OUTPATIENT)
Dept: FAMILY MEDICINE CLINIC | Facility: CLINIC | Age: 19
End: 2024-03-11
Payer: COMMERCIAL

## 2024-03-11 VITALS
HEIGHT: 62 IN | DIASTOLIC BLOOD PRESSURE: 80 MMHG | BODY MASS INDEX: 27.09 KG/M2 | OXYGEN SATURATION: 100 % | SYSTOLIC BLOOD PRESSURE: 130 MMHG | HEART RATE: 80 BPM | TEMPERATURE: 98 F | WEIGHT: 147.2 LBS

## 2024-03-11 DIAGNOSIS — G44.229 CHRONIC TENSION-TYPE HEADACHE, NOT INTRACTABLE: Primary | ICD-10-CM

## 2024-03-11 PROCEDURE — 99213 OFFICE O/P EST LOW 20 MIN: CPT | Performed by: PHYSICIAN ASSISTANT

## 2024-03-11 NOTE — PROGRESS NOTES
"    Subjective        Chief Complaint  Follow-up depression, anxiety, headaches    Subjective      Alyse Campbell is a 18 y.o. female who presents today to [facility] for Follow-up. She has a past medical history of ADHD, Anxiety, Asthma, Depression, PTSD, Self-injurious behavior, and Suicide attempt. She also has a history of a \"hole in her heart.\"    Depression:   Anxiety:   Non suicidal self-injurious behavior:   Eating disorder:   Now established with  psychiatry. Upcoming appt on 4/2/24. They have not yet made any changes to her medications as the patient has not yet granted the psychiatric APRN permission to discuss her medications with her previous PCP. Reports she is not taking any medications at this time. Denies any SI/HI at this time. She has a known history of non suicidal self harm with cutting.     Previously managed on sertraline 200 mg daily, hydroxyzine twice daily at an unknown milligram, BuSpar 7.5 mg twice daily, and prazosin 2 mg nightly. As well as methylphenidate for ADHD.    Reports doing well on her midterms at school. She is disappointed as her mother was supposed to come get her for spring break and she called to tell her she wasn't coming.     Headaches:   Her psychiatric APRN had come to me after the last appointment as the patient had complained of frequent, daily headaches.  She requested a neurology referral as well as a medication to try to prevent the headaches.  She was placed on propranolol 10 mg twice daily which she reports made the headaches worse.  She has tried taking this regularly and feels worse when she takes it.  She was contacted about the neurology referral, however, reports she does not have a vehicle and would not be able to travel for an appointment.  She has not tried NSAIDs such as ibuprofen or Aleve nor has she tried Tylenol.  She reports she was told not to take these in the past as this is what she had previously overdosed on.  " Denies any current plan for overdose or thoughts of suicide.    Needs help with food/transportation:   Our case management social worker did contact her about local assistive programs and trying to get a refund on her meal plan through the school to help with grocery expenses.  She still has difficulties with transportation as she does not have a vehicle.     Heart Defect:  Was told she had a hole in her heart when she was a child.  She was evaluated for surgical correction, however, reported it is a high risk surgery.  She was last seen by cardiology as a child in [place].  No recent evaluation or echocardiogram.  She has chronic shortness of breath and chest discomfort with exertion, no recent change.  She also has chronic palpitations and wore a Holter monitor in [place] over the past summer without reported concerns.  She has a history of a syncopal episode over the summer, however, this was with significant heat exposure.  No recurrent syncope since then.    Hx of elevated LFTs:   LFTs previously elevated in the ER in 04/2023. Previously felt to be 2/2 her medications. No reported alcohol use or regular tylenol intake. No known liver conditions.  These were recently reevaluated here in the office and have normalized.  Subsequent testing for hepatitis C and hepatitis B were negative.  Iron levels were low and not elevated.    Iron deficiency:  Recent iron profile showed iron level of 28, T saturation of 6, transferrin 302, TIBC 450.  Ferritin was 7.28.  She was started Monoferric's which she reports she is tolerating well without any GI upset or constipation.    Vitamin D deficiency:  Vitamin D 20.9, started on oral supplementation.       Current Outpatient Medications:     busPIRone (BUSPAR) 7.5 MG tablet, Take 1 tablet by mouth 2 (Two) Times a Day., Disp: 60 tablet, Rfl: 0    cholecalciferol (Vitamin D) 25 MCG (1000 UT) tablet, Take 1 tablet by mouth Daily., Disp: 30 tablet, Rfl: 3    hydrOXYzine  "(ATARAX) 50 MG tablet, Take 1 tablet by mouth 2 (Two) Times a Day., Disp: , Rfl:     iron polysaccharides (NIFEREX) 150 MG capsule, Take 1 capsule by mouth Daily., Disp: 30 capsule, Rfl: 3    methylphenidate 27 MG CR tablet, TAKE ONE TABLET BY MOUTH EVERY MORNING. MAX DAILY AMOUNT 27MG, Disp: , Rfl:     prazosin (MINIPRESS) 2 MG capsule, Take 1 capsule by mouth Every Night., Disp: 30 capsule, Rfl: 0    sertraline (ZOLOFT) 100 MG tablet, Take 2 tablets by mouth Daily. Indications: Major Depressive Disorder, Disp: , Rfl:       No Known Allergies    Objective     Objective   Vital Signs:  /80   Pulse 80   Temp 98 °F (36.7 °C) (Temporal)   Ht 157.5 cm (62.01\")   Wt 66.8 kg (147 lb 3.2 oz)   SpO2 100%   BMI 26.92 kg/m²   Estimated body mass index is 26.92 kg/m² as calculated from the following:    Height as of this encounter: 157.5 cm (62.01\").    Weight as of this encounter: 66.8 kg (147 lb 3.2 oz).    Pediatric BMI = 87 %ile (Z= 1.12) based on CDC (Girls, 2-20 Years) BMI-for-age based on BMI available as of 3/11/2024.. BMI is >= 25 and <30. (Overweight) The following options were offered after discussion;: weight loss educational material (shared in after visit summary)    Past Medical History:   Diagnosis Date    ADHD (attention deficit hyperactivity disorder)     Anxiety     Asthma     Depression     Self-injurious behavior     Started cutting at age 10.    Suicide attempt     10/2022-\"I swallowed pills.\"     Past Surgical History:   Procedure Laterality Date    NO PAST SURGERIES       Social History     Socioeconomic History    Marital status: Single    Number of children: 0    Years of education: 12 th    Highest education level: High school graduate   Tobacco Use    Smoking status: Never     Passive exposure: Never    Smokeless tobacco: Never   Vaping Use    Vaping status: Never Used   Substance and Sexual Activity    Alcohol use: Never    Drug use: Never    Sexual activity: Not Currently     Partners: " Male     Physical Exam  Vitals and nursing note reviewed.   Constitutional:       General: She is not in acute distress.     Appearance: She is well-developed. She is not diaphoretic.   HENT:      Head: Normocephalic and atraumatic.   Eyes:      General: No scleral icterus.        Right eye: No discharge.         Left eye: No discharge.      Conjunctiva/sclera: Conjunctivae normal.   Cardiovascular:      Rate and Rhythm: Normal rate and regular rhythm.      Heart sounds: Normal heart sounds. No murmur heard.     No friction rub. No gallop.   Pulmonary:      Effort: Pulmonary effort is normal. No respiratory distress.      Breath sounds: Normal breath sounds. No wheezing or rales.   Chest:      Chest wall: No tenderness.   Musculoskeletal:         General: Normal range of motion.      Cervical back: Normal range of motion and neck supple.   Skin:     General: Skin is warm and dry.      Coloration: Skin is not pale.      Findings: No erythema or rash.   Neurological:      Mental Status: She is alert and oriented to person, place, and time.   Psychiatric:         Behavior: Behavior normal.      Comments: Appears clean, well kempt. Clothed appropriately. Appears in better spirits today than with previous visits.         Result Review :  The following data was reviewed by: SYBIL Latham on 01/24/2024:  TSH   Date Value Ref Range Status   01/24/2024 2.190 0.270 - 4.200 uIU/mL Final     HDL Cholesterol   Date Value Ref Range Status   01/24/2024 62 (H) 40 - 60 mg/dL Final     LDL Cholesterol    Date Value Ref Range Status   01/24/2024 81 0 - 100 mg/dL Final     Triglycerides   Date Value Ref Range Status   01/24/2024 50 0 - 150 mg/dL Final           Assessment / Plan         Assessment   Diagnoses and all orders for this visit:    1.  Headaches  Neurology referral was requested, however, she reports she did not answer the phone to schedule the appointment as she does not have transportation.  I did provide her with  the neurology office number to see if they may be able to do a televisit.  Also encouraged her to check on [facility] for medical appointment travel.  Nurtec was denied by her insurance.  She tried propranolol 10 mg twice daily and she reports this worsened her symptoms.  Discontinue.  Discussed trying OTC Tylenol, or NSAIDs as needed such as Aleve or ibuprofen.  She reports she has been told not to take these as she overdosed on them in the past.  I discussed that with any medication or substance that overdose would be a risk if she took more than the recommended dose. I explained that if she has a concern that she may take too much medication, to hold off on medical therapy for now.  Discussed activity changes as well as non-medication therapies which may help with headache.  Educational materials provided in the AVS.    2. Severe episode of recurrent major depressive disorder, with psychotic features (Primary)  3. Nonsuicidal self-injury  4. Unspecified mood (affective) disorder  5. Anxiety disorder, unspecified type  Currently adamantly denies SI/HI. Discussed that with any development of SI/HI, to call 911 or go directly to the ER.   Continue counseling. Continue follow up with  psychiatry. Will let them continue to work on clarifying her medications with her previous PCP if the patient will allow.   Discussed if she has any concern that she may overdose on medications that she has at home, to call 911 or go the ER.     6. Heart defect  Transthoracic echocardiogram with bubble study pending. Will see if this can be arranged at [facility] due to travel constraints.   Consider referral to cardiology pending results.   Reports having a holter over the summer in [place] without reported abnormality. Will consider repeating if symptoms are increasing or becoming more frequent.     Follow Up   Return in about 2 months (around 5/11/2024).    Patient was given instructions and counseling regarding her condition  or for health maintenance advice. Please see specific information pulled into the AVS if appropriate.       This document has been electronically signed by SYBIL Latham   March 11, 2024 16:00 EDT    Dictated Utilizing Dragon Dictation: Part of this note may be an electronic transcription/translation of spoken language to printed text using the Dragon Dictation System.

## 2024-03-26 ENCOUNTER — OFFICE VISIT (OUTPATIENT)
Dept: FAMILY MEDICINE CLINIC | Facility: CLINIC | Age: 19
End: 2024-03-26
Payer: COMMERCIAL

## 2024-03-26 VITALS
OXYGEN SATURATION: 98 % | HEIGHT: 62 IN | BODY MASS INDEX: 26.76 KG/M2 | TEMPERATURE: 97.7 F | DIASTOLIC BLOOD PRESSURE: 80 MMHG | HEART RATE: 79 BPM | SYSTOLIC BLOOD PRESSURE: 115 MMHG | WEIGHT: 145.4 LBS

## 2024-03-26 DIAGNOSIS — R11.0 NAUSEA: ICD-10-CM

## 2024-03-26 DIAGNOSIS — E61.1 IRON DEFICIENCY: ICD-10-CM

## 2024-03-26 DIAGNOSIS — R55 SYNCOPE, UNSPECIFIED SYNCOPE TYPE: Primary | ICD-10-CM

## 2024-03-26 LAB
BILIRUB BLD-MCNC: NEGATIVE MG/DL
CLARITY, POC: CLEAR
COLOR UR: YELLOW
EXPIRATION DATE: NORMAL
GLUCOSE UR STRIP-MCNC: NEGATIVE MG/DL
KETONES UR QL: NEGATIVE
LEUKOCYTE EST, POC: NEGATIVE
Lab: NORMAL
NITRITE UR-MCNC: NEGATIVE MG/ML
PH UR: 6 [PH] (ref 5–8)
PROT UR STRIP-MCNC: NEGATIVE MG/DL
RBC # UR STRIP: NEGATIVE /UL
SP GR UR: 1.01 (ref 1–1.03)
UROBILINOGEN UR QL: NORMAL

## 2024-03-26 PROCEDURE — 83735 ASSAY OF MAGNESIUM: CPT | Performed by: PHYSICIAN ASSISTANT

## 2024-03-26 PROCEDURE — 82728 ASSAY OF FERRITIN: CPT | Performed by: PHYSICIAN ASSISTANT

## 2024-03-26 PROCEDURE — 80050 GENERAL HEALTH PANEL: CPT | Performed by: PHYSICIAN ASSISTANT

## 2024-03-26 PROCEDURE — 84134 ASSAY OF PREALBUMIN: CPT | Performed by: PHYSICIAN ASSISTANT

## 2024-03-26 PROCEDURE — 84466 ASSAY OF TRANSFERRIN: CPT | Performed by: PHYSICIAN ASSISTANT

## 2024-03-26 PROCEDURE — 83540 ASSAY OF IRON: CPT | Performed by: PHYSICIAN ASSISTANT

## 2024-03-26 RX ORDER — ONDANSETRON 4 MG/1
4 TABLET, FILM COATED ORAL EVERY 8 HOURS PRN
Qty: 20 TABLET | Refills: 1 | Status: SHIPPED | OUTPATIENT
Start: 2024-03-26

## 2024-03-26 NOTE — PROGRESS NOTES
"    Subjective        Chief Complaint  Loss of Consciousness    Subjective      Alyse Campbell is a 18 y.o. female who presents today to [facility] for Loss of Consciousness. She has a past medical history of ADHD, Anxiety, Asthma, Depression, PTSD, Self-injurious behavior, and Suicide attempt. She also has a history of a \"hole in her heart.\"    Loss of Consciousness:  She reports that she was sitting in class this morning when she had a syncopal episode. Denies any fall or injury. Reports being able to hear other talking, but did not fully wake up for about 5 minutes or so. She had a feeling of dizziness and heart racing before the episode. She reports having food poisoning about 1 week ago with 2 days of nausea/vomiting. She has been afraid to eat since then for fear of getting sick. She reports not eating in 6 days. She has a history of restricting her eating, but reports she has been more afraid to eat for fear of getting nauseated. Reports having food at her apartment. She has a meal plan at the [employer], but reports she has not been back since that is where she got food poisoning.       Current Outpatient Medications:     cholecalciferol (Vitamin D) 25 MCG (1000 UT) tablet, Take 1 tablet by mouth Daily., Disp: 30 tablet, Rfl: 3    hydrOXYzine (ATARAX) 50 MG tablet, Take 1 tablet by mouth 2 (Two) Times a Day., Disp: , Rfl:     iron polysaccharides (NIFEREX) 150 MG capsule, Take 1 capsule by mouth Daily., Disp: 30 capsule, Rfl: 3    methylphenidate 27 MG CR tablet, TAKE ONE TABLET BY MOUTH EVERY MORNING. MAX DAILY AMOUNT 27MG, Disp: , Rfl:     prazosin (MINIPRESS) 2 MG capsule, Take 1 capsule by mouth Every Night., Disp: 30 capsule, Rfl: 0    sertraline (ZOLOFT) 100 MG tablet, Take 2 tablets by mouth Daily. Indications: Major Depressive Disorder, Disp: , Rfl:     ondansetron (Zofran) 4 MG tablet, Take 1 tablet by mouth Every 8 (Eight) Hours As Needed for Nausea or Vomiting., Disp: " "20 tablet, Rfl: 1      No Known Allergies    Objective     Objective   Vital Signs:  /80   Pulse 79   Temp 97.7 °F (36.5 °C) (Temporal)   Ht 157.5 cm (62.01\")   Wt 66 kg (145 lb 6.4 oz)   SpO2 98%   BMI 26.59 kg/m²   Estimated body mass index is 26.59 kg/m² as calculated from the following:    Height as of this encounter: 157.5 cm (62.01\").    Weight as of this encounter: 66 kg (145 lb 6.4 oz).       Past Medical History:   Diagnosis Date    ADHD (attention deficit hyperactivity disorder)     Anxiety     Asthma     Depression     Self-injurious behavior     Started cutting at age 10.    Suicide attempt     10/2022-\"I swallowed pills.\"     Past Surgical History:   Procedure Laterality Date    NO PAST SURGERIES       Social History     Socioeconomic History    Marital status: Single    Number of children: 0    Years of education: 12 th    Highest education level: High school graduate   Tobacco Use    Smoking status: Never     Passive exposure: Never    Smokeless tobacco: Never   Vaping Use    Vaping status: Never Used   Substance and Sexual Activity    Alcohol use: Never    Drug use: Never    Sexual activity: Not Currently     Partners: Male      Physical Exam  Vitals and nursing note reviewed.   Constitutional:       General: She is not in acute distress.     Appearance: She is well-developed. She is not diaphoretic.   HENT:      Head: Normocephalic and atraumatic.      Comments: Right side cerumen impaction.   Eyes:      General: No scleral icterus.        Right eye: No discharge.         Left eye: No discharge.      Conjunctiva/sclera: Conjunctivae normal.   Cardiovascular:      Rate and Rhythm: Normal rate and regular rhythm.      Heart sounds: Normal heart sounds. No murmur heard.     No friction rub. No gallop.   Pulmonary:      Effort: Pulmonary effort is normal. No respiratory distress.      Breath sounds: Normal breath sounds. No wheezing or rales.   Chest:      Chest wall: No tenderness. "   Musculoskeletal:         General: Normal range of motion.      Cervical back: Normal range of motion and neck supple.   Skin:     General: Skin is warm and dry.      Coloration: Skin is not pale.      Findings: No erythema or rash.   Neurological:      Mental Status: She is alert and oriented to person, place, and time.   Psychiatric:         Behavior: Behavior normal.        Result Review :  The following data was reviewed by: SYBIL Latham on 03/26/2024:  TSH   Date Value Ref Range Status   01/24/2024 2.190 0.270 - 4.200 uIU/mL Final     HDL Cholesterol   Date Value Ref Range Status   01/24/2024 62 (H) 40 - 60 mg/dL Final     LDL Cholesterol    Date Value Ref Range Status   01/24/2024 81 0 - 100 mg/dL Final     Triglycerides   Date Value Ref Range Status   01/24/2024 50 0 - 150 mg/dL Final       ECG 12 Lead    Date/Time: 3/26/2024 1:35 PM  Performed by: Ilana Staples PA    Authorized by: Ilana Staples PA  Comparison: compared with previous ECG   Similar to previous ECG  Rhythm: sinus rhythm  Rate: normal  BPM: 65  QRS axis: normal    Clinical impression: normal ECG  Comments: QTc 407ms.          Assessment / Plan         Assessment   Diagnoses and all orders for this visit:    1. Syncope, unspecified syncope type (Primary)  2. Nausea  Vital signs normal in the office today.  Glucose 115.  Suspect that recent GI illness and now fear of eating due to nausea likely contributing. Has also been restrictive on her eating in the past. May have underlying eating disorder.   Encouraged on trying liquids such as gatorade, pedialyte, popsicles, etc. Then advance diet as tolerated. PRN zofran sent to pharmacy. Discussed trying the zofran and waiting 30mins before trying to eat to help avoid nausea and decrease her fear of eating.   She has a follow up with psychiatry on Tuesday. Encouraged on keeping this appt.   EKG obtained without acute findings. Laboratory studies also sent, will notify of  results when available.   Return to clinic if no improvement noted or if symptoms are worsening.         -     ECG 12 Lead  -     CBC & Differential  -     Comprehensive Metabolic Panel  -     TSH  -     Prealbumin  -     Magnesium  -     Iron Profile  -     Ferritin  -     POCT urinalysis dipstick, automated  -     ondansetron (Zofran) 4 MG tablet; Take 1 tablet by mouth Every 8 (Eight) Hours As Needed for Nausea or Vomiting.  Dispense: 20 tablet; Refill: 1       New Medications Ordered This Visit   Medications    ondansetron (Zofran) 4 MG tablet     Sig: Take 1 tablet by mouth Every 8 (Eight) Hours As Needed for Nausea or Vomiting.     Dispense:  20 tablet     Refill:  1     Follow Up   Return if symptoms worsen or fail to improve, for Follow up with PCP as scheduled.    Patient was given instructions and counseling regarding her condition or for health maintenance advice. Please see specific information pulled into the AVS if appropriate.       This document has been electronically signed by SYBIL Latham   March 26, 2024 13:32 EDT    Dictated Utilizing Dragon Dictation: Part of this note may be an electronic transcription/translation of spoken language to printed text using the Dragon Dictation System.

## 2024-03-27 LAB
ALBUMIN SERPL-MCNC: 4.8 G/DL (ref 3.5–5.2)
ALBUMIN/GLOB SERPL: 1.7 G/DL
ALP SERPL-CCNC: 86 U/L (ref 39–117)
ALT SERPL W P-5'-P-CCNC: 10 U/L (ref 1–33)
ANION GAP SERPL CALCULATED.3IONS-SCNC: 12.6 MMOL/L (ref 5–15)
AST SERPL-CCNC: 13 U/L (ref 1–32)
BASOPHILS # BLD AUTO: 0.01 10*3/MM3 (ref 0–0.2)
BASOPHILS NFR BLD AUTO: 0.2 % (ref 0–1.5)
BILIRUB SERPL-MCNC: 0.3 MG/DL (ref 0–1.2)
BUN SERPL-MCNC: 8 MG/DL (ref 6–20)
BUN/CREAT SERPL: 10.4 (ref 7–25)
CALCIUM SPEC-SCNC: 9.4 MG/DL (ref 8.6–10.5)
CHLORIDE SERPL-SCNC: 102 MMOL/L (ref 98–107)
CO2 SERPL-SCNC: 23.4 MMOL/L (ref 22–29)
CREAT SERPL-MCNC: 0.77 MG/DL (ref 0.57–1)
DEPRECATED RDW RBC AUTO: 43.2 FL (ref 37–54)
EGFRCR SERPLBLD CKD-EPI 2021: 114.1 ML/MIN/1.73
EOSINOPHIL # BLD AUTO: 0.04 10*3/MM3 (ref 0–0.4)
EOSINOPHIL NFR BLD AUTO: 0.9 % (ref 0.3–6.2)
ERYTHROCYTE [DISTWIDTH] IN BLOOD BY AUTOMATED COUNT: 15 % (ref 12.3–15.4)
FERRITIN SERPL-MCNC: 11 NG/ML (ref 13–150)
GLOBULIN UR ELPH-MCNC: 2.8 GM/DL
GLUCOSE SERPL-MCNC: 93 MG/DL (ref 65–99)
HCT VFR BLD AUTO: 37.9 % (ref 34–46.6)
HGB BLD-MCNC: 12.4 G/DL (ref 12–15.9)
IMM GRANULOCYTES # BLD AUTO: 0.02 10*3/MM3 (ref 0–0.05)
IMM GRANULOCYTES NFR BLD AUTO: 0.5 % (ref 0–0.5)
IRON 24H UR-MRATE: 31 MCG/DL (ref 37–145)
IRON SATN MFR SERPL: 7 % (ref 20–50)
LYMPHOCYTES # BLD AUTO: 1.01 10*3/MM3 (ref 0.7–3.1)
LYMPHOCYTES NFR BLD AUTO: 23.3 % (ref 19.6–45.3)
MAGNESIUM SERPL-MCNC: 2.5 MG/DL (ref 1.7–2.2)
MCH RBC QN AUTO: 26.4 PG (ref 26.6–33)
MCHC RBC AUTO-ENTMCNC: 32.7 G/DL (ref 31.5–35.7)
MCV RBC AUTO: 80.6 FL (ref 79–97)
MONOCYTES # BLD AUTO: 0.15 10*3/MM3 (ref 0.1–0.9)
MONOCYTES NFR BLD AUTO: 3.5 % (ref 5–12)
NEUTROPHILS NFR BLD AUTO: 3.11 10*3/MM3 (ref 1.7–7)
NEUTROPHILS NFR BLD AUTO: 71.6 % (ref 42.7–76)
NRBC BLD AUTO-RTO: 0 /100 WBC (ref 0–0.2)
PLATELET # BLD AUTO: 470 10*3/MM3 (ref 140–450)
PMV BLD AUTO: 10.8 FL (ref 6–12)
POTASSIUM SERPL-SCNC: 3.9 MMOL/L (ref 3.5–5.2)
PREALB SERPL-MCNC: 20.9 MG/DL (ref 20–40)
PROT SERPL-MCNC: 7.6 G/DL (ref 6–8.5)
RBC # BLD AUTO: 4.7 10*6/MM3 (ref 3.77–5.28)
SODIUM SERPL-SCNC: 138 MMOL/L (ref 136–145)
TIBC SERPL-MCNC: 459 MCG/DL (ref 298–536)
TRANSFERRIN SERPL-MCNC: 308 MG/DL (ref 200–360)
TSH SERPL DL<=0.05 MIU/L-ACNC: 2.04 UIU/ML (ref 0.27–4.2)
WBC NRBC COR # BLD AUTO: 4.34 10*3/MM3 (ref 3.4–10.8)

## 2024-03-27 RX ORDER — IRON POLYSACCHARIDE COMPLEX 150 MG
150 CAPSULE ORAL EVERY OTHER DAY
Qty: 15 CAPSULE | Refills: 5 | Status: SHIPPED | OUTPATIENT
Start: 2024-03-27

## 2024-03-27 RX ORDER — RIBOFLAVIN (VITAMIN B2) 100 MG
100 TABLET ORAL DAILY
Qty: 30 TABLET | Refills: 5 | Status: SHIPPED | OUTPATIENT
Start: 2024-03-27

## 2024-03-27 NOTE — PROGRESS NOTES
Spoke with patient and she is aware,  she said she is allergic to citrus but I told her I assume water would be fine.
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
## 2024-04-02 ENCOUNTER — OFFICE VISIT (OUTPATIENT)
Dept: PSYCHIATRY | Facility: CLINIC | Age: 19
End: 2024-04-02
Payer: COMMERCIAL

## 2024-04-02 VITALS
WEIGHT: 141 LBS | HEART RATE: 102 BPM | OXYGEN SATURATION: 99 % | HEIGHT: 62 IN | DIASTOLIC BLOOD PRESSURE: 88 MMHG | BODY MASS INDEX: 25.95 KG/M2 | SYSTOLIC BLOOD PRESSURE: 122 MMHG

## 2024-04-02 DIAGNOSIS — F33.1 MODERATE EPISODE OF RECURRENT MAJOR DEPRESSIVE DISORDER: ICD-10-CM

## 2024-04-02 DIAGNOSIS — F41.1 GENERALIZED ANXIETY DISORDER: ICD-10-CM

## 2024-04-02 DIAGNOSIS — F50.9 ATYPICAL ANOREXIA NERVOSA: ICD-10-CM

## 2024-04-02 DIAGNOSIS — F90.9 ATTENTION DEFICIT HYPERACTIVITY DISORDER (ADHD), UNSPECIFIED ADHD TYPE: ICD-10-CM

## 2024-04-02 DIAGNOSIS — Z91.52 PERSONAL HISTORY OF NONSUICIDAL SELF-HARM: ICD-10-CM

## 2024-04-02 DIAGNOSIS — F43.10 POSTTRAUMATIC STRESS DISORDER: Primary | ICD-10-CM

## 2024-04-02 PROCEDURE — 99214 OFFICE O/P EST MOD 30 MIN: CPT | Performed by: NURSE PRACTITIONER

## 2024-04-02 RX ORDER — ARIPIPRAZOLE 2 MG/1
2 TABLET ORAL DAILY
Qty: 30 TABLET | Refills: 0 | Status: SHIPPED | OUTPATIENT
Start: 2024-04-02

## 2024-04-02 RX ORDER — SERTRALINE HYDROCHLORIDE 100 MG/1
200 TABLET, FILM COATED ORAL DAILY
Qty: 60 TABLET | Refills: 0 | Status: SHIPPED | OUTPATIENT
Start: 2024-04-02

## 2024-04-02 RX ORDER — HYDROXYZINE 50 MG/1
50 TABLET, FILM COATED ORAL 2 TIMES DAILY
Start: 2024-04-02

## 2024-04-02 RX ORDER — PRAZOSIN HYDROCHLORIDE 2 MG/1
2 CAPSULE ORAL NIGHTLY
Start: 2024-04-02

## 2024-04-02 NOTE — PROGRESS NOTES
"      Maged Campbell is a 18 y.o. female is here today for medication management follow-up.    Chief Complaint:  depression anxiety     History of Present Illness:    Patient presents today for a follow up for medication management for depression and anxiety. Denies any medical changes since last visit. Patient states cut this past Saturday or Sunday. Patient states was alone and seeing everyone posting about Easter. Patient states was seeing everyone with their families and got upset. Patient states got food poisoning from cafeteria a couple weeks ago. Patient states classes been busy and feeling some burnt out. Patient reports her grades are all As, but feel like they could be better. Patient states not sure when ate last. Patient states didn't eat at all during the weekend. Patient states feeling a little sick to stomach. Patient states when think about food then it makes a little nauseous. Patient denies any binging or purging. Patient states still hearing three voices in head. Patient reports one sounds like mom, one sounds like a \"little me\", and don't recognize the other one. Patient states usually telling her to hurt herself in some way. Patient reports the voices say things that are about suicidal and self harm. Denies any suicidal thoughts or homicidal thoughts today. Patient states a few days ago had thoughts of wish wasn't here anymore. Denies any plan. Patient reports hearing the voices all day everyday. Patient states seeing bugs all the time and then look away then look back and gone. Patient states that has been going on for a while. Depression at a 8.5 on a 0/10 scale with 10 the worst. Denies any panic attacks. Anxiety at a 4 on a 0/10 scale with 10 the worst. Patient states been too scared lately to sleep too much. Patient states scared about nightmares and convinced self going to die if go to sleep. Patient states always been a thought but worse the last week or so. Patient states " "concerta not helping with focus and attention. Patient states almost on verge of panic attack because everything going so slow that can't sit through it anymore with classes. Patient reports struggling with paying attention. Patient reports think about getting up and going for a walk but worried what professor may think. Patient states not able to do schoolwork at dorm due to room mate distracting her with different things so go to [employer] and finish it until they close. Patient states having some flashbacks to past trauma. Patient states she needs zoloft refilled due to being almost out.   The following portions of the patient's history were reviewed and updated as appropriate: allergies, current medications, past family history, past medical history, past social history, past surgical history, and problem list.    Review of Systems   Constitutional:  Positive for appetite change.   Respiratory: Negative.     Cardiovascular: Negative.    Gastrointestinal: Negative.    Neurological: Negative.    Psychiatric/Behavioral:  Positive for decreased concentration, dysphoric mood, hallucinations and sleep disturbance. The patient is nervous/anxious.        Objective   Physical Exam  Vitals reviewed.   Constitutional:       Appearance: Normal appearance. She is well-developed and well-groomed.   Neurological:      Mental Status: She is alert.   Psychiatric:         Attention and Perception: Attention and perception normal.         Mood and Affect: Affect normal. Mood is anxious.         Speech: Speech is delayed.         Behavior: Behavior normal. Behavior is cooperative.         Thought Content: Thought content normal.         Cognition and Memory: Cognition and memory normal.         Judgment: Judgment is impulsive.       Blood pressure 122/88, pulse 102, height 157.5 cm (62.01\"), weight 64 kg (141 lb), SpO2 99%.Body mass index is 25.78 kg/m².    No Known Allergies    Medication List:   Current Outpatient Medications "   Medication Sig Dispense Refill    hydrOXYzine (ATARAX) 50 MG tablet Take 1 tablet by mouth 2 (Two) Times a Day.      prazosin (MINIPRESS) 2 MG capsule Take 1 capsule by mouth Every Night.      sertraline (ZOLOFT) 100 MG tablet Take 2 tablets by mouth Daily. Indications: Major Depressive Disorder 60 tablet 0    ARIPiprazole (Abilify) 2 MG tablet Take 1 tablet by mouth Daily. 30 tablet 0    cholecalciferol (Vitamin D) 25 MCG (1000 UT) tablet Take 1 tablet by mouth Daily. 30 tablet 3    iron polysaccharides (NIFEREX) 150 MG capsule Take 1 capsule by mouth Daily. 30 capsule 3    iron polysaccharides (NIFEREX) 150 MG capsule Take 1 capsule by mouth Every Other Day. with a glass of orange juice. 15 capsule 5    methylphenidate 27 MG CR tablet TAKE ONE TABLET BY MOUTH EVERY MORNING. MAX DAILY AMOUNT 27MG      ondansetron (Zofran) 4 MG tablet Take 1 tablet by mouth Every 8 (Eight) Hours As Needed for Nausea or Vomiting. 20 tablet 1    vitamin C (VITAMIN C) 100 MG tablet Take 1 tablet by mouth Daily. (Patient not taking: Reported on 4/2/2024) 30 tablet 5     No current facility-administered medications for this visit.       Mental Status Exam:   Hygiene:   good  Cooperation:  Guarded  Eye Contact:  Fair  Psychomotor Behavior:  Appropriate  Affect:  Appropriate  Hopelessness: Denies  Speech:  Normal  Thought Process:  Goal directed and Linear  Thought Content:  Normal  Suicidal:  None  Homicidal:  None  Hallucinations:  Auditory and Visual  Delusion:  None  Memory:  Intact  Orientation:  Person, Place, Time, and Situation  Reliability:  fair  Insight:  Poor  Judgement:  Poor  Impulse Control:  Poor  Physical/Medical Issues:  No             Assessment & Plan   Diagnoses and all orders for this visit:    1. Posttraumatic stress disorder (Primary)  -     sertraline (ZOLOFT) 100 MG tablet; Take 2 tablets by mouth Daily. Indications: Major Depressive Disorder  Dispense: 60 tablet; Refill: 0  -     prazosin (MINIPRESS) 2 MG  capsule; Take 1 capsule by mouth Every Night.  -     hydrOXYzine (ATARAX) 50 MG tablet; Take 1 tablet by mouth 2 (Two) Times a Day.  -     ARIPiprazole (Abilify) 2 MG tablet; Take 1 tablet by mouth Daily.  Dispense: 30 tablet; Refill: 0    2. Moderate episode of recurrent major depressive disorder  -     sertraline (ZOLOFT) 100 MG tablet; Take 2 tablets by mouth Daily. Indications: Major Depressive Disorder  Dispense: 60 tablet; Refill: 0  -     ARIPiprazole (Abilify) 2 MG tablet; Take 1 tablet by mouth Daily.  Dispense: 30 tablet; Refill: 0    3. Atypical anorexia nervosa    4. Generalized anxiety disorder  -     sertraline (ZOLOFT) 100 MG tablet; Take 2 tablets by mouth Daily. Indications: Major Depressive Disorder  Dispense: 60 tablet; Refill: 0  -     hydrOXYzine (ATARAX) 50 MG tablet; Take 1 tablet by mouth 2 (Two) Times a Day.    5. Attention deficit hyperactivity disorder (ADHD), unspecified ADHD type    6. Personal history of nonsuicidal self-harm            Discussed medication options with patient. Start Abilify 2 mg daily for worsening depression, mood, and hallucinations. Cont. Zoloft 100 mg two tablets daily for depression, anxiety, and PTSD. Cont. Hydroxyzine 50 mg twice daily for anxiety. Cont. Concerta as written per PCP for ADHD. Reviewed the risks, benefits, and side effects of the medications; patient acknowledged and verbally consented. Patient was instructed on medication side effects, benefits, and also of no treatment.  Patient was given an explanation regarding potential for increased risk of diabetes, lipids, and weight gain.  Labs will be assessed as clinically indicated.  Diet was discussed especially healthy diet choices and increasing activity and exercise.  Patient was strongly urged to continue weight maintance or weight loss efforts.  Patient reported verbalized understanding of instructions.  Encouraged patient to be compliant with therapy appointments with Maksim Powers LCSW.  Discussed with patient safety plan to contact suicide prevention hotline if begin having any suicidal thoughts or self harm thoughts. Patient acknowledged and agreed.    Patient is agreeable to call the office with any questions, concerns, or worsening of symptoms. Patient is aware to call 911 or go to the nearest ER should begin having any thoughts to harm self or others.          Follow up in two weeks        Errors in dictation may reflect use of voice recognition software and not all errors in transcription may have been detected prior to signing.              This document has been electronically signed by NANCY Ureña   April 15, 2024 17:03 EDT

## 2024-04-15 ENCOUNTER — OFFICE VISIT (OUTPATIENT)
Dept: PSYCHIATRY | Facility: CLINIC | Age: 19
End: 2024-04-15
Payer: COMMERCIAL

## 2024-04-15 DIAGNOSIS — F90.9 ATTENTION DEFICIT HYPERACTIVITY DISORDER (ADHD), UNSPECIFIED ADHD TYPE: ICD-10-CM

## 2024-04-15 DIAGNOSIS — G44.52 NEW DAILY PERSISTENT HEADACHE: Primary | ICD-10-CM

## 2024-04-15 DIAGNOSIS — F43.10 POSTTRAUMATIC STRESS DISORDER: Primary | ICD-10-CM

## 2024-04-15 DIAGNOSIS — F41.1 GENERALIZED ANXIETY DISORDER: ICD-10-CM

## 2024-04-15 DIAGNOSIS — Z91.51 HISTORY OF SUICIDE ATTEMPT: ICD-10-CM

## 2024-04-15 DIAGNOSIS — F42.2 MIXED OBSESSIONAL THOUGHTS AND ACTS: ICD-10-CM

## 2024-04-15 DIAGNOSIS — Z91.52 PERSONAL HISTORY OF NONSUICIDAL SELF-HARM: ICD-10-CM

## 2024-04-15 DIAGNOSIS — R45.81 LOW SELF ESTEEM: ICD-10-CM

## 2024-04-15 DIAGNOSIS — R45.851 PASSIVE SUICIDAL IDEATIONS: ICD-10-CM

## 2024-04-15 DIAGNOSIS — Z60.4 SOCIAL ISOLATION: ICD-10-CM

## 2024-04-15 SDOH — SOCIAL STABILITY - SOCIAL INSECURITY: SOCIAL EXCLUSION AND REJECTION: Z60.4

## 2024-04-16 RX ORDER — RIMEGEPANT SULFATE 75 MG/75MG
75 TABLET, ORALLY DISINTEGRATING ORAL DAILY PRN
Qty: 4 TABLET | Refills: 0 | COMMUNITY
Start: 2024-04-16
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
## 2024-04-19 ENCOUNTER — OFFICE VISIT (OUTPATIENT)
Dept: PSYCHIATRY | Facility: CLINIC | Age: 19
End: 2024-04-19
Payer: COMMERCIAL

## 2024-04-19 VITALS
HEIGHT: 62 IN | OXYGEN SATURATION: 100 % | SYSTOLIC BLOOD PRESSURE: 126 MMHG | BODY MASS INDEX: 26.68 KG/M2 | WEIGHT: 145 LBS | HEART RATE: 86 BPM | DIASTOLIC BLOOD PRESSURE: 98 MMHG

## 2024-04-19 DIAGNOSIS — F43.10 POSTTRAUMATIC STRESS DISORDER: ICD-10-CM

## 2024-04-19 DIAGNOSIS — F41.1 GENERALIZED ANXIETY DISORDER: ICD-10-CM

## 2024-04-19 DIAGNOSIS — F33.1 MODERATE EPISODE OF RECURRENT MAJOR DEPRESSIVE DISORDER: ICD-10-CM

## 2024-04-19 NOTE — PROGRESS NOTES
"      Maged Campbell is a 18 y.o. female is here today for medication management follow-up.    Chief Complaint:  PTSD OCD depression     History of Present Illness:    Patient presents today for a follow up for medication management for PTSD, OCD, and depression. Patient states was in the hospital for attempting suicide by taking [id number] of tylenol. Patient states called the ambulance after talking to a friend. Patient states was took to the hospital and was there for about five hours. Patient states had to do charcoal and denies any admittance. Patient states was thinking about it for a couple days but didn't think was going to do. Patient states roommate left and was watching [employer]. Patient states then didn't realize was sitting in chair will pills in hand. Patient reports she had counted out pills so it was an even number of 20. Patient states after took them and freaked out a little bit due to feeling like not in control and then called friend. Patient states since incident then whole body hurts and aches. Patient states the muscle soreness didn't start until after incident with tylenol. Patient denies any thoughts to harm self or others since then. Patient states the cutting has been \"ok\" and not sure last time. Patient states finals until May 10 th. Patient reports still have As and denies any trouble with school. Patient states feel like ADHD is still struggling and unable to sit still. Patient states still taking the Concerta prescribed by PCP. Patient reports usually take on week days. Patient states feel like mind is going so fast and it is so loud. Patient reports a lot of struggle with unable to sit still. Denies any side effects from abilify. Patient states not noticed any difference. Patient states saw a bat a few days ago and never seen a bat before. Patient states she has seen shadows and bugs. Patient states some days are better than others with the three voices in head. Patient states " appetite has been doing better and eating at least once a day. Denies any nausea or vomiting. Denies any binging or purging. Depression at a 8 on a 0/10 scale with 10 the worst. Patient states had one panic attack last night. Patient states was going for a walk and guys driving really close to her. Patient states neighbor upstairs is loud so have to leave and walk for a little bit. Anxiety at a 8 on a 0/10 scale with 10 the worst. Patient reports sleeping depends on the day. Sleeping at least 4-5 hours each night. Patient is struggling with falling asleep and nightmares. Patient reports she had some flashbacks recently. Denies any irritability or anger. Patient states having to do things twice always. Patient states have to be early to class and have to be more than 30 mins early. Patient states have to count steps on side walk. Patient reports if touch something then have to even it out with other side. Patient states even with self harm if go in one direction then have to do same direction and can't be odd number have to be even. Patient reports if step on a crack then have to go back and do again. Patient reports in her head if its an odd number then doesn't count. Patient reports she doesn't see a big difference with Concerta. Patient reports still dealing with the headaches and PCP is trying to get referrals in. Patient states it starts as soon as wake up and feels like throbbing and pins and needles. Patient reports sometimes makes her dizzy. Patient adamantly denies any thoughts to harm self or others today. Patient reports medication compliance and denies any side effects.   The following portions of the patient's history were reviewed and updated as appropriate: allergies, current medications, past family history, past medical history, past social history, past surgical history, and problem list.    Review of Systems   Constitutional:  Positive for appetite change.   Respiratory: Negative.    "  Cardiovascular: Negative.    Gastrointestinal: Negative.    Neurological:  Positive for dizziness and headaches.   Psychiatric/Behavioral:  Positive for decreased concentration, dysphoric mood and sleep disturbance. The patient is nervous/anxious and is hyperactive.        Objective   Physical Exam  Vitals reviewed.   Constitutional:       Appearance: Normal appearance. She is well-developed and well-groomed.   Neurological:      Mental Status: She is alert.   Psychiatric:         Attention and Perception: Attention and perception normal.         Mood and Affect: Affect normal. Mood is anxious.         Speech: Speech normal.         Behavior: Behavior normal. Behavior is cooperative.         Thought Content: Thought content normal.         Cognition and Memory: Cognition and memory normal.         Judgment: Judgment is impulsive.       Blood pressure 126/98, pulse 86, height 157.5 cm (62.01\"), weight 65.8 kg (145 lb), SpO2 100%.Body mass index is 26.51 kg/m².    No Known Allergies    Medication List:   Current Outpatient Medications   Medication Sig Dispense Refill    ARIPiprazole (ABILIFY) 5 MG tablet Take 1 tablet by mouth Daily.      prazosin (MINIPRESS) 2 MG capsule Take 1 capsule by mouth Every Night.      sertraline (ZOLOFT) 100 MG tablet Take 2 tablets by mouth Daily. Indications: Major Depressive Disorder      cholecalciferol (Vitamin D) 25 MCG (1000 UT) tablet Take 1 tablet by mouth Daily. 30 tablet 3    hydrOXYzine (ATARAX) 50 MG tablet Take 1 tablet by mouth 2 (Two) Times a Day.      iron polysaccharides (NIFEREX) 150 MG capsule Take 1 capsule by mouth Daily. 30 capsule 3    iron polysaccharides (NIFEREX) 150 MG capsule Take 1 capsule by mouth Every Other Day. with a glass of orange juice. 15 capsule 5    ketorolac (TORADOL) 10 MG tablet Take 1 tablet by mouth Every 6 (Six) Hours As Needed for Moderate Pain (Headache) for up to 4 days. 16 tablet 0    methylphenidate 27 MG CR tablet TAKE ONE TABLET BY " MOUTH EVERY MORNING. MAX DAILY AMOUNT 27MG      ondansetron (Zofran) 4 MG tablet Take 1 tablet by mouth Every 8 (Eight) Hours As Needed for Nausea or Vomiting. 20 tablet 1    vitamin C (VITAMIN C) 100 MG tablet Take 1 tablet by mouth Daily. 30 tablet 5     No current facility-administered medications for this visit.       Mental Status Exam:   Hygiene:   good  Cooperation:  Cooperative  Eye Contact:  Good  Psychomotor Behavior:  Appropriate  Affect:  Appropriate  Hopelessness: Denies  Speech:  Normal  Thought Process:  Goal directed and Linear  Thought Content:  Normal  Suicidal:  None  Homicidal:  None  Hallucinations:  Not demonstrated today  Delusion:  None  Memory:  Intact  Orientation:  Person, Place, Time, and Situation  Reliability:  poor  Insight:  Poor  Judgement:  Poor  Impulse Control:  Poor  Physical/Medical Issues:  No              Assessment & Plan   Diagnoses and all orders for this visit:    1. Posttraumatic stress disorder  -     ARIPiprazole (ABILIFY) 5 MG tablet; Take 1 tablet by mouth Daily.  -     sertraline (ZOLOFT) 100 MG tablet; Take 2 tablets by mouth Daily. Indications: Major Depressive Disorder  -     prazosin (MINIPRESS) 2 MG capsule; Take 1 capsule by mouth Every Night.    2. Moderate episode of recurrent major depressive disorder  -     ARIPiprazole (ABILIFY) 5 MG tablet; Take 1 tablet by mouth Daily.  -     sertraline (ZOLOFT) 100 MG tablet; Take 2 tablets by mouth Daily. Indications: Major Depressive Disorder    3. Generalized anxiety disorder  -     sertraline (ZOLOFT) 100 MG tablet; Take 2 tablets by mouth Daily. Indications: Major Depressive Disorder            Discussed medication options with patient. Cont. Abilify 5 mg daily for mood and depression. Cont. Zoloft 100 mg two tablets daily for depression, anxiety, and PTSD. Cont. Prazosin 2 mg daily at night for PTSD nightmares. Discussed with patient will review records regarding hospital visit as well as records from PCP office  before changing any medications. Patient acknowledged and agreed. Reviewed the risks, benefits, and side effects of the medications; patient acknowledged and verbally consented.   The patient was provided extensive education regarding diagnosis, treatment options, risk of treatment, and risk of no treatment.  Patient was provided education regarding selective serotonin reuptake inhibitors including black box warning regarding increasing suicidality in this age group. Patient was agreeable to continue current anti-depressive medication. Patient was counseled to call clinic for any severe side effects.  Patient will be reevaluated carefully.  Patient was instructed on medication side effects, benefits, and also of no treatment.  Patient was given an explanation regarding potential for increased risk of diabetes, lipids, and weight gain.  Labs will be assessed as clinically indicated.  Diet was discussed especially healthy diet choices and increasing activity and exercise.  Patient was strongly urged to continue weight maintance or weight loss efforts.  Patient reported verbalized understanding of instructions.   Patient is agreeable to call the office with any questions, concerns, or worsening of symptoms. Patient is aware to call 911 or go to the nearest ER should begin having any thoughts to harm self or others.           Follow up in two weeks          Errors in dictation may reflect use of voice recognition software and not all errors in transcription may have been detected prior to signing.              This document has been electronically signed by NANCY Ureña   April 26, 2024 16:50 EDT

## 2024-04-25 ENCOUNTER — OFFICE VISIT (OUTPATIENT)
Dept: FAMILY MEDICINE CLINIC | Facility: CLINIC | Age: 19
End: 2024-04-25
Payer: COMMERCIAL

## 2024-04-25 VITALS
HEART RATE: 118 BPM | SYSTOLIC BLOOD PRESSURE: 130 MMHG | TEMPERATURE: 98.8 F | DIASTOLIC BLOOD PRESSURE: 90 MMHG | BODY MASS INDEX: 27.42 KG/M2 | OXYGEN SATURATION: 98 % | HEIGHT: 62 IN | WEIGHT: 149 LBS

## 2024-04-25 DIAGNOSIS — G43.009 MIGRAINE WITHOUT AURA AND WITHOUT STATUS MIGRAINOSUS, NOT INTRACTABLE: ICD-10-CM

## 2024-04-25 DIAGNOSIS — F39 UNSPECIFIED MOOD (AFFECTIVE) DISORDER: ICD-10-CM

## 2024-04-25 DIAGNOSIS — F41.9 ANXIETY DISORDER, UNSPECIFIED TYPE: ICD-10-CM

## 2024-04-25 DIAGNOSIS — Q24.9 HEART DEFECT, CONGENITAL: ICD-10-CM

## 2024-04-25 DIAGNOSIS — R45.88 NONSUICIDAL SELF-INJURY: ICD-10-CM

## 2024-04-25 DIAGNOSIS — R51.9 PERSISTENT HEADACHES: Primary | ICD-10-CM

## 2024-04-25 DIAGNOSIS — H61.21 IMPACTED CERUMEN OF RIGHT EAR: ICD-10-CM

## 2024-04-25 DIAGNOSIS — F33.3 SEVERE EPISODE OF RECURRENT MAJOR DEPRESSIVE DISORDER, WITH PSYCHOTIC FEATURES: ICD-10-CM

## 2024-04-25 PROCEDURE — 99214 OFFICE O/P EST MOD 30 MIN: CPT | Performed by: PHYSICIAN ASSISTANT

## 2024-04-25 PROCEDURE — 96372 THER/PROPH/DIAG INJ SC/IM: CPT | Performed by: PHYSICIAN ASSISTANT

## 2024-04-25 RX ORDER — DEXAMETHASONE SODIUM PHOSPHATE 4 MG/ML
8 INJECTION, SOLUTION INTRA-ARTICULAR; INTRALESIONAL; INTRAMUSCULAR; INTRAVENOUS; SOFT TISSUE ONCE
Status: COMPLETED | OUTPATIENT
Start: 2024-04-25 | End: 2024-04-25

## 2024-04-25 RX ORDER — KETOROLAC TROMETHAMINE 30 MG/ML
60 INJECTION, SOLUTION INTRAMUSCULAR; INTRAVENOUS ONCE
Status: COMPLETED | OUTPATIENT
Start: 2024-04-25 | End: 2024-04-25

## 2024-04-25 RX ORDER — KETOROLAC TROMETHAMINE 10 MG/1
10 TABLET, FILM COATED ORAL EVERY 6 HOURS PRN
Qty: 16 TABLET | Refills: 0 | Status: SHIPPED | OUTPATIENT
Start: 2024-04-25 | End: 2024-04-29

## 2024-04-25 RX ORDER — KETOROLAC TROMETHAMINE 10 MG/1
10 TABLET, FILM COATED ORAL EVERY 6 HOURS PRN
Qty: 16 TABLET | Refills: 0 | Status: SHIPPED | OUTPATIENT
Start: 2024-04-25 | End: 2024-04-25 | Stop reason: SDUPTHER

## 2024-04-25 RX ADMIN — DEXAMETHASONE SODIUM PHOSPHATE 8 MG: 4 INJECTION, SOLUTION INTRA-ARTICULAR; INTRALESIONAL; INTRAMUSCULAR; INTRAVENOUS; SOFT TISSUE at 15:14

## 2024-04-25 RX ADMIN — KETOROLAC TROMETHAMINE 60 MG: 30 INJECTION, SOLUTION INTRAMUSCULAR; INTRAVENOUS at 15:15

## 2024-04-25 NOTE — PROGRESS NOTES
"    Subjective        Chief Complaint  Headache    Subjective      Alyse Campbell is a 18 y.o. female who presents today to [facility] for Headache. She has a past medical history of ADHD, Anxiety, Asthma, Depression, PTSD, Self-injurious behavior, and Suicide attempt. She also has a history of a \"hole in her heart.\"    Headaches:   Hx of multiple concussions:   Reports worsening of headaches after her last concussion which would have been about 1 year ago.  She reports a mostly bilateral headache which is described as sharp pain.  She does have some light sensitivity at times.  Denies any sound sensitivity, nausea, vomiting.  Denies any neck pain or muscle spasms in the neck.  Denies any sinus pressure or congestion. Not sleeping well.     Previously tried propranolol which she reported made the headaches worse.  Also tried a total of 4 doses of Nurtec, reports this also made the symptoms worse.  She has some relief with Tylenol, however, she has a history of overdose on Tylenol and appears to have a trigger with this medication and prefers to not have it in the home.  She is also concerned about taking medication such as ibuprofen or Aleve due to the same reason.    Neurology referral was arranged locally, however, she has difficulty with transportation.  Therefore neurology referral was requested in [place] for when she goes home for the summer.  This is in process.    Depression:   Anxiety:   Non suicidal self-injurious behavior:   Eating disorder:  ADHD:  Past history of SI with attempt:   Following with  psychiatry. Reports recent intentional overdose where she took 10,000mg of tylenol. Reports she felt like something was telling her that she needed to take this much. Reports compelled to take exactly 10 tablets. Reports she was treated at the [facility], records requested. Denies any active SI/HI currently. Has not had any further thoughts of overdose since. "     She has a known history of non suicidal self harm with cutting. She is intermittently restrictive on her eating as well. Recently started on abilify 5mg daily by psychiatry.     Current regimen includes: Counseling, hydroxyzine 50 mg twice daily sertraline 200 mg daily, prazosin 2 mg Palpable, Abilify 5 mg daily. ?methylphenidate for ADHD.    Previously tried: BuSpar.     Heart Defect:  Palpitations:   Was told she had a hole in her heart when she was a child.  She was evaluated for surgical correction, however, reported it is a high risk surgery.  She was last seen by cardiology as a child in [place].  Transthoracic echocardiogram with bubble study previously ordered in January 2024, however, she has difficulty with transportation and this has not been completed as of yet.    She also has chronic palpitations and wore a Holter monitor in [place] over the past summer without reported concerns.  She has a history of a syncopal episode over the summer, however, this was with significant heat exposure.  No recurrent syncope since then.    Hx of elevated LFTs:   LFTs previously elevated in the ER in 04/2023. Previously felt to be 2/2 her medications. No reported alcohol use or regular tylenol intake. No known liver conditions.  These were recently reevaluated here in the office and have normalized.  Subsequent testing for hepatitis C and hepatitis B were negative.  Iron levels were low.    Iron deficiency:  Recent iron profile showed iron level of 28, T saturation of 6, transferrin 302, TIBC 450.  Ferritin was 7.28.  She was started Niferex which she reports she is tolerating well without any GI upset or constipation.    Vitamin D deficiency:  Vitamin D 20.9, started on oral supplementation.       Current Outpatient Medications:     cholecalciferol (Vitamin D) 25 MCG (1000 UT) tablet, Take 1 tablet by mouth Daily., Disp: 30 tablet, Rfl: 3    hydrOXYzine (ATARAX) 50 MG tablet, Take 1 tablet by mouth 2 (Two)  "Times a Day., Disp: , Rfl:     iron polysaccharides (NIFEREX) 150 MG capsule, Take 1 capsule by mouth Daily., Disp: 30 capsule, Rfl: 3    iron polysaccharides (NIFEREX) 150 MG capsule, Take 1 capsule by mouth Every Other Day. with a glass of orange juice., Disp: 15 capsule, Rfl: 5    ketorolac (TORADOL) 10 MG tablet, Take 1 tablet by mouth Every 6 (Six) Hours As Needed for Moderate Pain (Headache) for up to 4 days., Disp: 16 tablet, Rfl: 0    methylphenidate 27 MG CR tablet, TAKE ONE TABLET BY MOUTH EVERY MORNING. MAX DAILY AMOUNT 27MG, Disp: , Rfl:     ondansetron (Zofran) 4 MG tablet, Take 1 tablet by mouth Every 8 (Eight) Hours As Needed for Nausea or Vomiting., Disp: 20 tablet, Rfl: 1    vitamin C (VITAMIN C) 100 MG tablet, Take 1 tablet by mouth Daily., Disp: 30 tablet, Rfl: 5    ARIPiprazole (ABILIFY) 5 MG tablet, Take 1 tablet by mouth Daily., Disp: , Rfl:     prazosin (MINIPRESS) 2 MG capsule, Take 1 capsule by mouth Every Night., Disp: , Rfl:     sertraline (ZOLOFT) 100 MG tablet, Take 2 tablets by mouth Daily. Indications: Major Depressive Disorder, Disp: , Rfl:       No Known Allergies    Objective     Objective   Vital Signs:  /90   Pulse 118   Temp 98.8 °F (37.1 °C) (Temporal)   Ht 157.5 cm (62.01\")   Wt 67.6 kg (149 lb)   SpO2 98%   BMI 27.24 kg/m²   Estimated body mass index is 27.24 kg/m² as calculated from the following:    Height as of this encounter: 157.5 cm (62.01\").    Weight as of this encounter: 67.6 kg (149 lb).    Pediatric BMI = 88 %ile (Z= 1.16) based on CDC (Girls, 2-20 Years) BMI-for-age based on BMI available as of 4/25/2024.. BMI is >= 25 and <30. (Overweight) The following options were offered after discussion;: weight loss educational material (shared in after visit summary)    Past Medical History:   Diagnosis Date    ADHD (attention deficit hyperactivity disorder)     Anxiety     Asthma     Depression     Self-injurious behavior     Started cutting at age 10.    " "Suicide attempt     10/2022-\"I swallowed pills.\"     Past Surgical History:   Procedure Laterality Date    NO PAST SURGERIES       Social History     Socioeconomic History    Marital status: Single    Number of children: 0    Years of education: 12 th    Highest education level: High school graduate   Tobacco Use    Smoking status: Never     Passive exposure: Never    Smokeless tobacco: Never   Vaping Use    Vaping status: Never Used   Substance and Sexual Activity    Alcohol use: Never    Drug use: Never    Sexual activity: Not Currently     Partners: Male     Physical Exam  Vitals and nursing note reviewed.   Constitutional:       General: She is not in acute distress.     Appearance: She is well-developed. She is not diaphoretic.   HENT:      Head: Normocephalic and atraumatic.      Right Ear: There is impacted cerumen.      Left Ear: There is no impacted cerumen.      Nose: No congestion or rhinorrhea.   Eyes:      General: No scleral icterus.        Right eye: No discharge.         Left eye: No discharge.      Extraocular Movements: Extraocular movements intact.      Conjunctiva/sclera: Conjunctivae normal.      Pupils: Pupils are equal, round, and reactive to light.   Cardiovascular:      Rate and Rhythm: Normal rate and regular rhythm.      Heart sounds: Normal heart sounds. No murmur heard.     No friction rub. No gallop.   Pulmonary:      Effort: Pulmonary effort is normal. No respiratory distress.      Breath sounds: Normal breath sounds. No wheezing or rales.   Chest:      Chest wall: No tenderness.   Musculoskeletal:         General: Normal range of motion.      Cervical back: Normal range of motion and neck supple.   Skin:     General: Skin is warm and dry.      Coloration: Skin is not pale.      Findings: No erythema or rash.   Neurological:      Mental Status: She is alert and oriented to person, place, and time.   Psychiatric:      Comments: Appears clean, well kempt. Clothed appropriately. Flat " affect. Soft spoken. Slow to respond to questioning at times with frequent pauses. Does not answer some questions.         Result Review :  The following data was reviewed by: SYBIL Latham on 01/24/2024:  TSH   Date Value Ref Range Status   03/26/2024 2.040 0.270 - 4.200 uIU/mL Final     HDL Cholesterol   Date Value Ref Range Status   01/24/2024 62 (H) 40 - 60 mg/dL Final     LDL Cholesterol    Date Value Ref Range Status   01/24/2024 81 0 - 100 mg/dL Final     Triglycerides   Date Value Ref Range Status   01/24/2024 50 0 - 150 mg/dL Final         Assessment / Plan         Assessment   Diagnoses and all orders for this visit:    1.  Persistent Headaches  Neurology referral to be arranged in [place] while she is home over the summer and will have transportation.  Difficult to treat as she is concerned about having Tylenol and NSAIDs in the home given history of overdose specifically with Tylenol.  Reports propranolol and Nurtec made her symptoms worse.  Avoiding medications which may interact with her psychiatric medications such as Triptans.  Discussed limited options.  She ultimately decided to proceed with IM ketorolac and IM Decadron today.  Will send 4 days of oral ketorolac to the pharmacy to take as needed should headaches persist or return.  Will check on the cost of monthly preventative migraine injection therapy as this would avoid having large quantities of medications specifically for headache in the home and low risk for overdose.  We once again discussed nonmedical therapies such as sleep routine, heat applied to the base of the neck, avoiding lights/TV/electronics before bed.  Return to clinic if no improvement noted or if symptoms are worsening.     2. Severe episode of recurrent major depressive disorder, with psychotic features (Primary)  3. Nonsuicidal self-injury  4. Unspecified mood (affective) disorder  5. Anxiety disorder, unspecified type  Once again discussed recommendation for  inpatient admission to the psychiatric unit of [facility] [place]. Currently adamantly denies SI/HI and declines inpatient stay. Discussed that with any development of SI/HI, to call 911 or go directly to the ER. Records requested from recent ER visit 2/2 reported intentional tylenol overdose.   Continue counseling.   Continue follow up with  psychiatry. Upcoming appt on 5/2.    6. Heart defect  Transthoracic echocardiogram with bubble study pending. Ordered for [facility] 2/2 transportation issues. She reports having difficulty getting there as well.   Consider referral to cardiology pending results.   Reports having a holter over the summer in [place] without reported abnormality. Will consider repeating if symptoms are increasing or becoming more frequent.     7. Right side cerumen impaction   Offered instrumentation as well as irrigation to help remove the cerumen impaction.  She declined for now.  Encouraged that if she has discomfort or decreased hearing, to return to clinic if she wishes to have it removed.  Can also use Debrox solution PRN as well as a bulb syringe to try to irrigate at home.     Follow Up   Return in about 3 weeks (around 5/16/2024) for Follow up headaches.    Patient was given instructions and counseling regarding her condition or for health maintenance advice. Please see specific information pulled into the AVS if appropriate.       This document has been electronically signed by SYBIL Latham   April 29, 2024 07:29 EDT    Dictated Utilizing Dragon Dictation: Part of this note may be an electronic transcription/translation of spoken language to printed text using the Dragon Dictation System.

## 2024-04-26 RX ORDER — SERTRALINE HYDROCHLORIDE 100 MG/1
200 TABLET, FILM COATED ORAL DAILY
Start: 2024-04-26

## 2024-04-26 RX ORDER — PRAZOSIN HYDROCHLORIDE 2 MG/1
2 CAPSULE ORAL NIGHTLY
Start: 2024-04-26

## 2024-04-26 RX ORDER — ARIPIPRAZOLE 5 MG/1
5 TABLET ORAL DAILY
Start: 2024-04-26

## 2024-04-29 ENCOUNTER — OFFICE VISIT (OUTPATIENT)
Dept: PSYCHIATRY | Facility: CLINIC | Age: 19
End: 2024-04-29
Payer: COMMERCIAL

## 2024-04-29 DIAGNOSIS — Z91.51 HISTORY OF SUICIDE ATTEMPT: ICD-10-CM

## 2024-04-29 DIAGNOSIS — F43.10 POSTTRAUMATIC STRESS DISORDER: Primary | ICD-10-CM

## 2024-04-29 DIAGNOSIS — R45.851 PASSIVE SUICIDAL IDEATIONS: ICD-10-CM

## 2024-04-29 DIAGNOSIS — Z91.52 PERSONAL HISTORY OF NONSUICIDAL SELF-HARM: ICD-10-CM

## 2024-04-29 DIAGNOSIS — R45.81 LOW SELF ESTEEM: ICD-10-CM

## 2024-04-29 DIAGNOSIS — Z60.4 SOCIAL ISOLATION: ICD-10-CM

## 2024-04-29 DIAGNOSIS — F42.2 MIXED OBSESSIONAL THOUGHTS AND ACTS: ICD-10-CM

## 2024-04-29 DIAGNOSIS — F33.1 MODERATE EPISODE OF RECURRENT MAJOR DEPRESSIVE DISORDER: ICD-10-CM

## 2024-04-29 DIAGNOSIS — F41.1 GENERALIZED ANXIETY DISORDER: ICD-10-CM

## 2024-04-29 DIAGNOSIS — F90.9 ATTENTION DEFICIT HYPERACTIVITY DISORDER (ADHD), UNSPECIFIED ADHD TYPE: ICD-10-CM

## 2024-04-29 PROCEDURE — 90837 PSYTX W PT 60 MINUTES: CPT | Performed by: SOCIAL WORKER

## 2024-04-29 RX ORDER — ERENUMAB-AOOE 70 MG/ML
70 INJECTION SUBCUTANEOUS
Qty: 1 ML | Refills: 0 | Status: SHIPPED | OUTPATIENT
Start: 2024-04-29

## 2024-04-29 SDOH — SOCIAL STABILITY - SOCIAL INSECURITY: SOCIAL EXCLUSION AND REJECTION: Z60.4
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
## 2024-05-02 ENCOUNTER — OFFICE VISIT (OUTPATIENT)
Dept: PSYCHIATRY | Facility: CLINIC | Age: 19
End: 2024-05-02
Payer: COMMERCIAL

## 2024-05-02 VITALS
OXYGEN SATURATION: 100 % | WEIGHT: 147.4 LBS | BODY MASS INDEX: 26.95 KG/M2 | SYSTOLIC BLOOD PRESSURE: 124 MMHG | HEART RATE: 91 BPM | DIASTOLIC BLOOD PRESSURE: 92 MMHG

## 2024-05-02 DIAGNOSIS — F41.1 GENERALIZED ANXIETY DISORDER: ICD-10-CM

## 2024-05-02 DIAGNOSIS — F43.10 POSTTRAUMATIC STRESS DISORDER: ICD-10-CM

## 2024-05-02 DIAGNOSIS — F33.1 MODERATE EPISODE OF RECURRENT MAJOR DEPRESSIVE DISORDER: ICD-10-CM

## 2024-05-02 PROCEDURE — 99214 OFFICE O/P EST MOD 30 MIN: CPT | Performed by: NURSE PRACTITIONER

## 2024-05-02 RX ORDER — SERTRALINE HYDROCHLORIDE 100 MG/1
200 TABLET, FILM COATED ORAL DAILY
Qty: 60 TABLET | Refills: 3 | Status: SHIPPED | OUTPATIENT
Start: 2024-05-02

## 2024-05-02 RX ORDER — HYDROXYZINE 50 MG/1
50 TABLET, FILM COATED ORAL 2 TIMES DAILY
Start: 2024-05-02

## 2024-05-02 RX ORDER — PRAZOSIN HYDROCHLORIDE 2 MG/1
2 CAPSULE ORAL NIGHTLY
Qty: 30 CAPSULE | Refills: 3 | Status: SHIPPED | OUTPATIENT
Start: 2024-05-02

## 2024-05-02 RX ORDER — ARIPIPRAZOLE 5 MG/1
5 TABLET ORAL DAILY
Qty: 30 TABLET | Refills: 3 | Status: SHIPPED | OUTPATIENT
Start: 2024-05-02

## 2024-05-02 NOTE — PROGRESS NOTES
Subjective   Alyse Campbell is a 18 y.o. female is here today for medication management follow-up.    Chief Complaint:  anxiety depression ADHD     History of Present Illness:    Patient presents today for a follow up for medication management for anxiety, depression, and ADHD. Denies any medical changes since last visit. Patient states still having the headaches everyday and the injection from PCP isn't helping. Denies any side effects from medications. Patient states not really noticed a big difference. Denies any suicidal or homicidal thoughts. Patient states cut last night leg in multiples of four. Patient has scabbed red areas on leg. Patient states a lot more panic attacks the last couple days and not sure what triggering them. Patient states had some flashbacks recently. Patient states next week is finals and then going back home. Patient states sleeping about 5 hours a night. Denies any nightmares. Appetite is increased but not eating. Patient states the last few days feeling more hungry than normal. Patient states eating a few rice krispy treats throughout the day. Denies eating at cafeteria. Patient reports sometimes eating muffins at coffee shop but not felt like it often. Depression at a 7 on a 0/10 scale with 10 the worst. Anxiety at a 9 on a 0/10 scale with 10 the worst. Patient reports medication compliance and denies any side effects. Denies any visual hallucinations. Patient states the voices have been calm lately. Patient states right now jingle michel rock song and mixes with Pancho theme song playing constantly in head. Patient states try to not cut and distance self with coloring, walking, running then feel like lose control and start. Patient states she does want to stop once start but have to cut at least four times because four is 2 twice and two is her number. Patient states she is using the hdyroyxine. Patient states she is returning to [place] after school ends and will not be back  in [place] until August when next semester starts.   The following portions of the patient's history were reviewed and updated as appropriate: allergies, current medications, past family history, past medical history, past social history, past surgical history, and problem list.    Review of Systems   Constitutional:  Positive for appetite change.   Respiratory: Negative.     Cardiovascular: Negative.    Gastrointestinal: Negative.    Neurological: Negative.    Psychiatric/Behavioral:  Positive for dysphoric mood, hallucinations and self-injury. The patient is nervous/anxious.        Objective   Physical Exam  Vitals reviewed.   Constitutional:       Appearance: Normal appearance. She is well-developed and well-groomed.   Neurological:      Mental Status: She is alert.   Psychiatric:         Attention and Perception: Attention and perception normal.         Mood and Affect: Affect normal. Mood is anxious.         Speech: Speech normal.         Behavior: Behavior normal. Behavior is cooperative.         Thought Content: Thought content normal.         Cognition and Memory: Cognition and memory normal.         Judgment: Judgment is impulsive.       Blood pressure 124/92, pulse 91, weight 66.9 kg (147 lb 6.4 oz), SpO2 100%.Body mass index is 26.95 kg/m².    No Known Allergies    Medication List:   Current Outpatient Medications   Medication Sig Dispense Refill    ARIPiprazole (ABILIFY) 5 MG tablet Take 1 tablet by mouth Daily. 30 tablet 3    hydrOXYzine (ATARAX) 50 MG tablet Take 1 tablet by mouth 2 (Two) Times a Day.      prazosin (MINIPRESS) 2 MG capsule Take 1 capsule by mouth Every Night. 30 capsule 3    sertraline (ZOLOFT) 100 MG tablet Take 2 tablets by mouth Daily. Indications: Major Depressive Disorder 60 tablet 3    cholecalciferol (Vitamin D) 25 MCG (1000 UT) tablet Take 1 tablet by mouth Daily. 90 tablet 1    Erenumab-aooe (Aimovig) 70 MG/ML auto-injector Inject 1 mL under the skin into the appropriate area  as directed Every 30 (Thirty) Days. 1 mL 0    iron polysaccharides (NIFEREX) 150 MG capsule Take 1 capsule by mouth Every Other Day. with a glass of orange juice. 45 capsule 1    methocarbamol (ROBAXIN) 750 MG tablet Take 1-2 tablets TID PRN for tension headache. 15 tablet 0    methylphenidate 27 MG CR tablet TAKE ONE TABLET BY MOUTH EVERY MORNING. MAX DAILY AMOUNT 27MG      ondansetron (Zofran) 4 MG tablet Take 1 tablet by mouth Every 8 (Eight) Hours As Needed for Nausea or Vomiting. 20 tablet 1     No current facility-administered medications for this visit.       Mental Status Exam:   Hygiene:   good  Cooperation:  Cooperative  Eye Contact:  Fair  Psychomotor Behavior:  Restless  Affect:  Appropriate  Hopelessness: Denies  Speech:  Normal  Thought Process:  Goal directed and Linear  Thought Content:  Mood congruent  Suicidal:  None  Homicidal:  None  Hallucinations:  Not demonstrated today  Delusion:  None  Memory:  Intact  Orientation:  Person, Place, Time, and Situation  Reliability:  fair  Insight:  Poor  Judgement:  Poor  Impulse Control:  Poor  Physical/Medical Issues:  No              Assessment & Plan   Diagnoses and all orders for this visit:    1. Posttraumatic stress disorder  -     ARIPiprazole (ABILIFY) 5 MG tablet; Take 1 tablet by mouth Daily.  Dispense: 30 tablet; Refill: 3  -     hydrOXYzine (ATARAX) 50 MG tablet; Take 1 tablet by mouth 2 (Two) Times a Day.  -     prazosin (MINIPRESS) 2 MG capsule; Take 1 capsule by mouth Every Night.  Dispense: 30 capsule; Refill: 3  -     sertraline (ZOLOFT) 100 MG tablet; Take 2 tablets by mouth Daily. Indications: Major Depressive Disorder  Dispense: 60 tablet; Refill: 3    2. Moderate episode of recurrent major depressive disorder  -     ARIPiprazole (ABILIFY) 5 MG tablet; Take 1 tablet by mouth Daily.  Dispense: 30 tablet; Refill: 3  -     sertraline (ZOLOFT) 100 MG tablet; Take 2 tablets by mouth Daily. Indications: Major Depressive Disorder  Dispense: 60  tablet; Refill: 3    3. Generalized anxiety disorder  -     hydrOXYzine (ATARAX) 50 MG tablet; Take 1 tablet by mouth 2 (Two) Times a Day.  -     sertraline (ZOLOFT) 100 MG tablet; Take 2 tablets by mouth Daily. Indications: Major Depressive Disorder  Dispense: 60 tablet; Refill: 3            Discussed medication options with patient. Cont. Abilify 5 mg daily for depression and mood. Cont. Hydroxyzine 50 mg twice daily for anxiety. Cont. Prazosin 2 mg daily at night for PTSD nightmares. Cont. Zoloft 100 mg two tablets daily for depression, anxiety, and PTSD. Reviewed the risks, benefits, and side effects of the medications; patient acknowledged and verbally consented. Patient was instructed on medication side effects, benefits, and also of no treatment.  Patient was given an explanation regarding potential for increased risk of diabetes, lipids, and weight gain.  Labs will be assessed as clinically indicated.  Diet was discussed especially healthy diet choices and increasing activity and exercise.  Patient was strongly urged to continue weight maintenance or weight loss efforts.  Patient reported verbalized understanding of instructions.  Encouraged patient to follow up with provider in [place] while on break and will continue to adjust medications as needed when school year resumes.     Patient is agreeable to call the office with any questions, concerns, or worsening of symptoms.  Patient is aware to call 911 or go to the nearest ER should begin having any thoughts to harm self or others.          Follow up in three months          Errors in dictation may reflect use of voice recognition software and not all errors in transcription may have been detected prior to signing.              This document has been electronically signed by NANCY Ureña   May 14, 2024 10:14 EDT

## 2024-05-09 ENCOUNTER — OFFICE VISIT (OUTPATIENT)
Dept: FAMILY MEDICINE CLINIC | Facility: CLINIC | Age: 19
End: 2024-05-09
Payer: COMMERCIAL

## 2024-05-09 VITALS
OXYGEN SATURATION: 100 % | WEIGHT: 150 LBS | DIASTOLIC BLOOD PRESSURE: 80 MMHG | SYSTOLIC BLOOD PRESSURE: 136 MMHG | TEMPERATURE: 97 F | HEIGHT: 62 IN | BODY MASS INDEX: 27.6 KG/M2 | HEART RATE: 102 BPM

## 2024-05-09 DIAGNOSIS — E61.1 IRON DEFICIENCY: ICD-10-CM

## 2024-05-09 DIAGNOSIS — G44.209 TENSION HEADACHE: Primary | ICD-10-CM

## 2024-05-09 PROCEDURE — 99213 OFFICE O/P EST LOW 20 MIN: CPT | Performed by: PHYSICIAN ASSISTANT

## 2024-05-09 RX ORDER — IRON POLYSACCHARIDE COMPLEX 150 MG
150 CAPSULE ORAL EVERY OTHER DAY
Qty: 45 CAPSULE | Refills: 1 | Status: SHIPPED | OUTPATIENT
Start: 2024-05-09

## 2024-05-09 RX ORDER — METHOCARBAMOL 750 MG/1
TABLET, FILM COATED ORAL
Qty: 15 TABLET | Refills: 0 | Status: SHIPPED | OUTPATIENT
Start: 2024-05-09

## 2024-05-09 RX ORDER — MELATONIN
1000 DAILY
Qty: 90 TABLET | Refills: 1 | Status: SHIPPED | OUTPATIENT
Start: 2024-05-09

## 2024-05-09 NOTE — PROGRESS NOTES
"    Subjective        Chief Complaint  Headache    Subjective      Alyse Campbell is a 18 y.o. female who presents today to [facility] for Headache. She has a past medical history of ADHD, Anxiety, Asthma, Depression, PTSD, Self-injurious behavior, and Suicide attempt. She also has a history of a \"hole in her heart.\"    Headaches:   Hx of multiple concussions:   Reports worsening of headaches after her last concussion which would have been about 1 year ago.  She reports a mostly bilateral headache which is described as sharp pain.  She does have some light sensitivity at times.  Denies any sound sensitivity, nausea, vomiting.  Denies any neck pain or muscle spasms in the neck.  Denies any sinus pressure or congestion. Not sleeping well.     Previously tried: propranolol, nurtec, Ketoralac.      Neurology referral was arranged locally, however, she has difficulty with transportation.  Therefore neurology referral was requested in [place] for when she goes home for the summer.  This is in process and she leaves for home tomorrow.       Current Outpatient Medications:     ARIPiprazole (ABILIFY) 5 MG tablet, Take 1 tablet by mouth Daily., Disp: 30 tablet, Rfl: 3    cholecalciferol (Vitamin D) 25 MCG (1000 UT) tablet, Take 1 tablet by mouth Daily., Disp: 90 tablet, Rfl: 1    Erenumab-aooe (Aimovig) 70 MG/ML auto-injector, Inject 1 mL under the skin into the appropriate area as directed Every 30 (Thirty) Days., Disp: 1 mL, Rfl: 0    hydrOXYzine (ATARAX) 50 MG tablet, Take 1 tablet by mouth 2 (Two) Times a Day., Disp: , Rfl:     iron polysaccharides (NIFEREX) 150 MG capsule, Take 1 capsule by mouth Every Other Day. with a glass of orange juice., Disp: 45 capsule, Rfl: 1    methylphenidate 27 MG CR tablet, TAKE ONE TABLET BY MOUTH EVERY MORNING. MAX DAILY AMOUNT 27MG, Disp: , Rfl:     ondansetron (Zofran) 4 MG tablet, Take 1 tablet by mouth Every 8 (Eight) Hours As Needed for Nausea or " "Vomiting., Disp: 20 tablet, Rfl: 1    prazosin (MINIPRESS) 2 MG capsule, Take 1 capsule by mouth Every Night., Disp: 30 capsule, Rfl: 3    sertraline (ZOLOFT) 100 MG tablet, Take 2 tablets by mouth Daily. Indications: Major Depressive Disorder, Disp: 60 tablet, Rfl: 3    methocarbamol (ROBAXIN) 750 MG tablet, Take 1-2 tablets TID PRN for tension headache., Disp: 15 tablet, Rfl: 0      No Known Allergies    Objective     Objective   Vital Signs:  /80 (BP Location: Left arm, Patient Position: Sitting, Cuff Size: Adult)   Pulse 102   Temp 97 °F (36.1 °C) (Temporal)   Ht 157.5 cm (62.01\")   Wt 68 kg (150 lb)   SpO2 100%   BMI 27.43 kg/m²   Estimated body mass index is 27.43 kg/m² as calculated from the following:    Height as of this encounter: 157.5 cm (62.01\").    Weight as of this encounter: 68 kg (150 lb).    Pediatric BMI = 88 %ile (Z= 1.18) based on CDC (Girls, 2-20 Years) BMI-for-age based on BMI available as of 5/9/2024.. BMI is >= 25 and <30. (Overweight) The following options were offered after discussion;: weight loss educational material (shared in after visit summary)    Past Medical History:   Diagnosis Date    ADHD (attention deficit hyperactivity disorder)     Anxiety     Asthma     Depression     Self-injurious behavior     Started cutting at age 10.    Suicide attempt     10/2022-\"I swallowed pills.\"     Past Surgical History:   Procedure Laterality Date    NO PAST SURGERIES       Social History     Socioeconomic History    Marital status: Single    Number of children: 0    Years of education: 12 th    Highest education level: High school graduate   Tobacco Use    Smoking status: Never     Passive exposure: Never    Smokeless tobacco: Never   Vaping Use    Vaping status: Never Used   Substance and Sexual Activity    Alcohol use: Never    Drug use: Never    Sexual activity: Not Currently     Partners: Male     Physical Exam  Vitals and nursing note reviewed.   Constitutional:       General: " She is not in acute distress.     Appearance: She is well-developed. She is not diaphoretic.   HENT:      Head: Normocephalic and atraumatic.      Nose: No congestion or rhinorrhea.   Eyes:      General: No scleral icterus.        Right eye: No discharge.         Left eye: No discharge.      Extraocular Movements: Extraocular movements intact.      Conjunctiva/sclera: Conjunctivae normal.      Pupils: Pupils are equal, round, and reactive to light.   Cardiovascular:      Rate and Rhythm: Normal rate and regular rhythm.      Heart sounds: Normal heart sounds. No murmur heard.     No friction rub. No gallop.   Pulmonary:      Effort: Pulmonary effort is normal. No respiratory distress.      Breath sounds: Normal breath sounds. No wheezing or rales.   Chest:      Chest wall: No tenderness.   Musculoskeletal:         General: Normal range of motion.      Cervical back: Normal range of motion and neck supple.   Skin:     General: Skin is warm and dry.      Coloration: Skin is not pale.      Findings: No erythema or rash.   Neurological:      Mental Status: She is alert and oriented to person, place, and time.   Psychiatric:      Comments: Appears clean, well kempt. Clothed appropriately. Flat affect. Soft spoken. Slow to respond to questioning at times with frequent pauses.         Result Review :  The following data was reviewed by: SYBIL Latham on 01/24/2024:  TSH   Date Value Ref Range Status   03/26/2024 2.040 0.270 - 4.200 uIU/mL Final     HDL Cholesterol   Date Value Ref Range Status   01/24/2024 62 (H) 40 - 60 mg/dL Final     LDL Cholesterol    Date Value Ref Range Status   01/24/2024 81 0 - 100 mg/dL Final     Triglycerides   Date Value Ref Range Status   01/24/2024 50 0 - 150 mg/dL Final         Assessment / Plan         Assessment   Diagnoses and all orders for this visit:    1.  Persistent Headaches  Neurology referral to be arranged in [place] while she is home over the summer and will have  transportation. She leaves for home tomorrow. Will check on the status of her appointment.   Difficult to treat as she is concerned about having Tylenol and NSAIDs in the home given history of overdose specifically with Tylenol.  Reports propranolol and Nurtec made her symptoms worse. No improvement with IM ketorolac/Decadron.   Avoiding medications which may interact with her psychiatric medications such as Triptans.  Will try addition of methocarbamol 750-1500mg TID PRN as she does seem to have some characteristics of tension headaches at times.   We once again discussed nonmedical therapies such as sleep routine, heat applied to the base of the neck, avoiding lights/TV/electronics before bed.  Return to clinic if no improvement noted or if symptoms are worsening.     Follow Up   Return in about 3 months (around 8/9/2024) for Recheck.    Patient was given instructions and counseling regarding her condition or for health maintenance advice. Please see specific information pulled into the AVS if appropriate.       This document has been electronically signed by SYBIL Latham   May 14, 2024 08:09 EDT    Dictated Utilizing Dragon Dictation: Part of this note may be an electronic transcription/translation of spoken language to printed text using the Dragon Dictation System.

## 2024-05-09 NOTE — Clinical Note
Please check on status of neuro referral in [place]. She is heading there tomorrow for the summer.